# Patient Record
Sex: MALE | Race: WHITE | NOT HISPANIC OR LATINO | Employment: FULL TIME | URBAN - METROPOLITAN AREA
[De-identification: names, ages, dates, MRNs, and addresses within clinical notes are randomized per-mention and may not be internally consistent; named-entity substitution may affect disease eponyms.]

---

## 2017-02-22 ENCOUNTER — OFFICE VISIT (OUTPATIENT)
Dept: FAMILY MEDICINE | Facility: CLINIC | Age: 32
End: 2017-02-22
Payer: COMMERCIAL

## 2017-02-22 VITALS
TEMPERATURE: 97.8 F | SYSTOLIC BLOOD PRESSURE: 123 MMHG | BODY MASS INDEX: 27.55 KG/M2 | DIASTOLIC BLOOD PRESSURE: 73 MMHG | WEIGHT: 186 LBS | HEART RATE: 59 BPM | HEIGHT: 69 IN | OXYGEN SATURATION: 100 %

## 2017-02-22 DIAGNOSIS — L82.0 SEBORRHEIC KERATOSES, INFLAMED: Primary | ICD-10-CM

## 2017-02-22 PROCEDURE — 99212 OFFICE O/P EST SF 10 MIN: CPT | Performed by: PHYSICIAN ASSISTANT

## 2017-02-22 NOTE — NURSING NOTE
"Chief Complaint   Patient presents with     Mole     Mole consult behind the L arm per pt notice this 20 yrs ago        Initial /73  Pulse 59  Temp 97.8  F (36.6  C) (Oral)  Ht 5' 9\" (1.753 m)  Wt 186 lb (84.4 kg)  SpO2 100%  BMI 27.47 kg/m2 Estimated body mass index is 27.47 kg/(m^2) as calculated from the following:    Height as of this encounter: 5' 9\" (1.753 m).    Weight as of this encounter: 186 lb (84.4 kg).  Medication Reconciliation: complete      Tony Jimenez MA    "

## 2017-02-22 NOTE — LETTER
[facility]  [address] [facility] [address]  Phone: [phone]    February 22, 2017        Brock Wood  [address]  [facility] [address]          To whom it may concern:    RE: Brock JAG Wood    Patient was seen and treated today at our clinic.    Please contact me for questions or concerns.      Sincerely,        Tayo Balderrama PA-C

## 2017-02-22 NOTE — PROGRESS NOTES
"  SUBJECTIVE:                                                    Brock Wood is a 31 year old male who presents to clinic today for the following health issues:    Mole Consult, located on the back side of the L arm per pt x 20 yrs.  States he will catch it on clothes and skin will rip off and then it comes back. No pain. No changes. Girlfriend is in RN school and wanted it looked at.     Problem list and histories reviewed & adjusted, as indicated.  Additional history: as documented    Patient Active Problem List   Diagnosis     CARDIOVASCULAR SCREENING; LDL GOAL LESS THAN 160     History of appendectomy     Tobacco abuse     Pilonidal cyst     Seborrheic keratoses, inflamed     Past Surgical History   Procedure Laterality Date     Vascular surgery       Appendectomy       Orthopedic surgery  2013     finger reattachment       Social History   Substance Use Topics     Smoking status: Former Smoker     Packs/day: 1.00     Types: Hookah     Smokeless tobacco: Never Used     Alcohol use Yes      Comment: weekly     Family History   Problem Relation Age of Onset     C.A.D. Father          No current outpatient prescriptions on file.     Allergies   Allergen Reactions     Coumadin [Warfarin]      Penicillin G      Problem list, Medication list, Allergies, and Medical/Social/Surgical histories reviewed in [facility] and updated as appropriate.    OBJECTIVE:                                                    /73  Pulse 59  Temp 97.8  F (36.6  C) (Oral)  Ht 5' 9\" (1.753 m)  Wt 186 lb (84.4 kg)  SpO2 100%  BMI 27.47 kg/m2  Body mass index is 27.47 kg/(m^2).  GENERAL: healthy, alert and no distress  SKIN: <5mm oval SK like lesion    Diagnostic Test Results:  none      ASSESSMENT/PLAN:                                                        ICD-10-CM    1. Seborrheic keratoses, inflamed L82.0    patient reassurance.  warning signs discussed.   If changes follow up    Tayo Balderrama PA-C  [facility] " [place]

## 2017-02-22 NOTE — MR AVS SNAPSHOT
"              After Visit Summary   2/22/2017    Brock Wood    MRN: [id number]           Patient Information     Date Of Birth          1985        Visit Information        Provider Department      2/22/2017 11:00 AM Tayo Balderrama PA-C [facility]        Today's Diagnoses     Seborrheic keratoses, inflamed    -  1       Follow-ups after your visit        Who to contact     If you have questions or need follow up information about today's clinic visit or your schedule please contact [facility] directly at [phone].  Normal or non-critical lab and imaging results will be communicated to you by [employer], letter or phone within 4 business days after the clinic has received the results. If you do not hear from us within 7 days, please contact the clinic through Dr[employer] or phone. If you have a critical or abnormal lab result, we will notify you by phone as soon as possible.  Submit refill requests through [employer] or call your pharmacy and they will forward the refill request to us. Please allow 3 business days for your refill to be completed.          Additional Information About Your Visit        MyChart Information     [employer] gives you secure access to your electronic health record. If you see a primary care provider, you can also send messages to your care team and make appointments. If you have questions, please call your primary care clinic.  If you do not have a primary care provider, please call [phone] and they will assist you.        Care EveryWhere ID     This is your Care EveryWhere ID. This could be used by other organizations to access your [place] medical records  [id number]        Your Vitals Were     Pulse Temperature Height Pulse Oximetry BMI (Body Mass Index)       59 97.8  F (36.6  C) (Oral) 5' 9\" (1.753 m) 100% 27.47 kg/m2        Blood Pressure from Last 3 Encounters:   02/22/17 123/73   03/20/15 (P) 120/78   01/06/15 113/69    Weight from Last " 3 Encounters:   02/22/17 186 lb (84.4 kg)   03/20/15 170 lb (77.1 kg)   01/06/15 174 lb (78.9 kg)              Today, you had the following     No orders found for display       Primary Care Provider Office Phone # Fax #    [facility] [phone] [address] [facility] [address]        Thank you!     Thank you for choosing [facility]  for your care. Our goal is always to provide you with excellent care. Hearing back from our patients is one way we can continue to improve our services. Please take a few minutes to complete the written survey that you may receive in the mail after your visit with us. Thank you!             Your Updated Medication List - Protect others around you: Learn how to safely use, store and throw away your medicines at www.disposemymeds.org.      Notice  As of 2/22/2017 11:13 AM    You have not been prescribed any medications.

## 2017-04-21 ENCOUNTER — OFFICE VISIT (OUTPATIENT)
Dept: URGENT CARE | Facility: URGENT CARE | Age: 32
End: 2017-04-21
Payer: COMMERCIAL

## 2017-04-21 VITALS
BODY MASS INDEX: 25.7 KG/M2 | DIASTOLIC BLOOD PRESSURE: 69 MMHG | WEIGHT: 174 LBS | TEMPERATURE: 97.1 F | HEART RATE: 56 BPM | SYSTOLIC BLOOD PRESSURE: 116 MMHG

## 2017-04-21 DIAGNOSIS — L08.9 FINGER INFECTION: Primary | ICD-10-CM

## 2017-04-21 PROCEDURE — 99213 OFFICE O/P EST LOW 20 MIN: CPT | Performed by: NURSE PRACTITIONER

## 2017-04-21 RX ORDER — CLINDAMYCIN HCL 300 MG
300 CAPSULE ORAL 3 TIMES DAILY
Qty: 30 CAPSULE | Refills: 0 | Status: SHIPPED | OUTPATIENT
Start: 2017-04-21 | End: 2017-05-01

## 2017-04-21 NOTE — NURSING NOTE
"Chief Complaint   Patient presents with     Thumb Discomfort     Left hand thumb, injured X 1 week ago       Initial /69  Pulse 56  Temp 97.1  F (36.2  C) (Oral)  Wt 174 lb (78.9 kg)  BMI 25.7 kg/m2 Estimated body mass index is 25.7 kg/(m^2) as calculated from the following:    Height as of 2/22/17: 5' 9\" (1.753 m).    Weight as of this encounter: 174 lb (78.9 kg).  Medication Reconciliation: complete   Avril Tellez CMA      "

## 2017-04-21 NOTE — MR AVS SNAPSHOT
After Visit Summary   4/21/2017    Brock Wood    MRN: [id number]           Patient Information     Date Of Birth          1985        Visit Information        Provider Department      4/21/2017 6:05 PM Vicky Duran APRN CNP [facility]        Today's Diagnoses     Finger infection    -  1       Follow-ups after your visit        Who to contact     If you have questions or need follow up information about today's clinic visit or your schedule please contact [facility] directly at [phone].  Normal or non-critical lab and imaging results will be communicated to you by MyChart, letter or phone within 4 business days after the clinic has received the results. If you do not hear from us within 7 days, please contact the clinic through [employer] or phone. If you have a critical or abnormal lab result, we will notify you by phone as soon as possible.  Submit refill requests through "[employer] or call your pharmacy and they will forward the refill request to us. Please allow 3 business days for your refill to be completed.          Additional Information About Your Visit        MyChart Information     "[employer] gives you secure access to your electronic health record. If you see a primary care provider, you can also send messages to your care team and make appointments. If you have questions, please call your primary care clinic.  If you do not have a primary care provider, please call [phone] and they will assist you.        Care EveryWhere ID     This is your Care EveryWhere ID. This could be used by other organizations to access your [place] medical records  [id number]        Your Vitals Were     Pulse Temperature BMI (Body Mass Index)             56 97.1  F (36.2  C) (Oral) 25.7 kg/m2          Blood Pressure from Last 3 Encounters:   04/21/17 116/69   02/22/17 123/73   03/20/15 (P) 120/78    Weight from Last 3 Encounters:   04/21/17 174 lb (78.9 kg)    02/22/17 186 lb (84.4 kg)   03/20/15 170 lb (77.1 kg)              Today, you had the following     No orders found for display         Today's Medication Changes          These changes are accurate as of: 4/21/17  7:25 PM.  If you have any questions, ask your nurse or doctor.               Start taking these medicines.        Dose/Directions    clindamycin 300 MG capsule   Commonly known as:  CLEOCIN   Used for:  Finger infection        Dose:  300 mg   Take 1 capsule (300 mg) by mouth 3 times daily for 10 days   Quantity:  30 capsule   Refills:  0            Where to get your medicines      These medications were sent to [facility] [address] [place] [address] [address] [facility]  [address] [facility] [address]     Phone:  [phone]     clindamycin 300 MG capsule                Primary Care Provider Office Phone # Fax #    [facility] [phone] [address] [facility]. [facility] [address]        Thank you!     Thank you for choosing [facility] [place]  for your care. Our goal is always to provide you with excellent care. Hearing back from our patients is one way we can continue to improve our services. Please take a few minutes to complete the written survey that you may receive in the mail after your visit with us. Thank you!             Your Updated Medication List - Protect others around you: Learn how to safely use, store and throw away your medicines at www.disposemymeds.org.          This list is accurate as of: 4/21/17  7:25 PM.  Always use your most recent med list.                   Brand Name Dispense Instructions for use    clindamycin 300 MG capsule    CLEOCIN    30 capsule    Take 1 capsule (300 mg) by mouth 3 times daily for 10 days

## 2017-04-21 NOTE — PROGRESS NOTES
SUBJECTIVE:                                                    Brock Wood is a 31 year old male who presents to clinic today for the following health issues:    Patient smashed thumb X 1 week ago. Now noticing, pain, swelling, discomfort. Also squeezed thumb and cottage cheese like discharge came out. Can move normally, no deformity    Problem list and histories reviewed & adjusted, as indicated.  Additional history: as documented    Patient Active Problem List   Diagnosis     CARDIOVASCULAR SCREENING; LDL GOAL LESS THAN 160     History of appendectomy     Tobacco abuse     Pilonidal cyst     Seborrheic keratoses, inflamed     Past Surgical History:   Procedure Laterality Date     APPENDECTOMY       ORTHOPEDIC SURGERY  2013    finger reattachment     VASCULAR SURGERY         Social History   Substance Use Topics     Smoking status: Former Smoker     Packs/day: 1.00     Types: Hookah     Smokeless tobacco: Never Used     Alcohol use Yes      Comment: weekly     Family History   Problem Relation Age of Onset     C.A.D. Father            ROS:  Constitutional, HEENT, cardiovascular, pulmonary, gi and gu systems are negative, except as otherwise noted.    OBJECTIVE:                                                    /69  Pulse 56  Temp 97.1  F (36.2  C) (Oral)  Wt 174 lb (78.9 kg)  BMI 25.7 kg/m2  Body mass index is 25.7 kg/(m^2).  GENERAL: healthy, alert and no distress  RESP: lungs clear to auscultation - no rales, rhonchi or wheezes  CV: regular rate and rhythm, normal S1 S2, no S3 or S4, no murmur, click or rub, no peripheral edema and peripheral pulses strong  SKIN: Thumb erythematous, tender, warm to touch surrounding nailbed    Diagnostic Test Results:  none      ASSESSMENT/PLAN:                                                        1. Finger infection    - clindamycin (CLEOCIN) 300 MG capsule; Take 1 capsule (300 mg) by mouth 3 times daily for 10 days  Dispense: 30 capsule; Refill: 0    Home  treat and monitor symptoms, call or rtc if worsening or not improving.   See Patient Instructions    RAVEN Feliciano [facility]

## 2018-10-30 ENCOUNTER — NURSE TRIAGE (OUTPATIENT)
Dept: NURSING | Facility: CLINIC | Age: 33
End: 2018-10-30

## 2018-11-12 ENCOUNTER — OFFICE VISIT (OUTPATIENT)
Dept: FAMILY MEDICINE | Facility: CLINIC | Age: 33
End: 2018-11-12
Payer: COMMERCIAL

## 2018-11-12 VITALS
RESPIRATION RATE: 18 BRPM | DIASTOLIC BLOOD PRESSURE: 76 MMHG | TEMPERATURE: 97.9 F | SYSTOLIC BLOOD PRESSURE: 130 MMHG | HEART RATE: 64 BPM | HEIGHT: 68 IN | OXYGEN SATURATION: 99 % | BODY MASS INDEX: 26.67 KG/M2 | WEIGHT: 176 LBS

## 2018-11-12 DIAGNOSIS — L05.91 PILONIDAL CYST: Primary | ICD-10-CM

## 2018-11-12 DIAGNOSIS — Z23 NEED FOR PROPHYLACTIC VACCINATION AND INOCULATION AGAINST INFLUENZA: ICD-10-CM

## 2018-11-12 DIAGNOSIS — I82.629 ACUTE DEEP VEIN THROMBOSIS (DVT) OF UPPER EXTREMITY, UNSPECIFIED LATERALITY, UNSPECIFIED VEIN (H): ICD-10-CM

## 2018-11-12 DIAGNOSIS — Z00.00 ROUTINE GENERAL MEDICAL EXAMINATION AT A HEALTH CARE FACILITY: ICD-10-CM

## 2018-11-12 DIAGNOSIS — Z13.1 SCREENING FOR DIABETES MELLITUS: ICD-10-CM

## 2018-11-12 DIAGNOSIS — Z13.220 LIPID SCREENING: ICD-10-CM

## 2018-11-12 LAB — GLUCOSE BLD-MCNC: 73 MG/DL (ref 70–99)

## 2018-11-12 PROCEDURE — 90471 IMMUNIZATION ADMIN: CPT | Performed by: FAMILY MEDICINE

## 2018-11-12 PROCEDURE — 82947 ASSAY GLUCOSE BLOOD QUANT: CPT | Performed by: FAMILY MEDICINE

## 2018-11-12 PROCEDURE — 99395 PREV VISIT EST AGE 18-39: CPT | Mod: 25 | Performed by: FAMILY MEDICINE

## 2018-11-12 PROCEDURE — 36415 COLL VENOUS BLD VENIPUNCTURE: CPT | Performed by: FAMILY MEDICINE

## 2018-11-12 PROCEDURE — 90686 IIV4 VACC NO PRSV 0.5 ML IM: CPT | Performed by: FAMILY MEDICINE

## 2018-11-12 ASSESSMENT — PAIN SCALES - GENERAL: PAINLEVEL: NO PAIN (0)

## 2018-11-12 NOTE — LETTER
[facility]  [address] [facility] [address]  [phone]        November 18, 2019    Brock Wood  [address] [facility] [address]              Dear Brock Wood    This is to remind you that your Fasting lab is due.    You may call our office at [phone] to schedule an appointment.    Please disregard this notice if you have already had your labs drawn or made an appointment.        Sincerely,        Jason Bear MD

## 2018-11-12 NOTE — MR AVS SNAPSHOT
After Visit Summary   11/12/2018    Brock Wood    MRN: [id number]           Patient Information     Date Of Birth          1985        Visit Information        Provider Department      11/12/2018 6:15 PM Jason Bear MD [facility]        Today's Diagnoses     Pilonidal cyst    -  1    Need for prophylactic vaccination and inoculation against influenza        Routine general medical examination at a health care facility        Acute deep vein thrombosis (DVT) of upper extremity, unspecified laterality, unspecified vein (H)        Lipid screening        Screening for diabetes mellitus          Care Instructions      Preventive Health Recommendations  Male Ages 26 - 39    Yearly exam:             See your health care provider every year in order to  o   Review health changes.   o   Discuss preventive care.    o   Review your medicines if your doctor has prescribed any.    You should be tested each year for STDs (sexually transmitted diseases), if you re at risk.     After age 35, talk to your provider about cholesterol testing. If you are at risk for heart disease, have your cholesterol tested at least every 5 years.     If you are at risk for diabetes, you should have a diabetes test (fasting glucose).  Shots: Get a flu shot each year. Get a tetanus shot every 10 years.     Nutrition:    Eat at least 5 servings of fruits and vegetables daily.     Eat whole-grain bread, whole-wheat pasta and brown rice instead of white grains and rice.     Get adequate Calcium and Vitamin D.     Lifestyle    Exercise for at least 150 minutes a week (30 minutes a day, 5 days a week). This will help you control your weight and prevent disease.     Limit alcohol to one drink per day.     No smoking.     Wear sunscreen to prevent skin cancer.     See your dentist every six months for an exam and cleaning.              Follow-ups after your visit        Additional Services     GENERAL SURG  ADULT REFERRAL       Your provider has referred you to: [facility]: [facility] [phone]   [email or web address]    Please be aware that coverage of these services is subject to the terms and limitations of your health insurance plan.  Call member services at your health plan with any benefit or coverage questions.      Please bring the following with you to your appointment:    (1) Any X-Rays, CTs or MRIs which have been performed.  Contact the facility where they were done to arrange for  prior to your scheduled appointment.   (2) List of current medications   (3) This referral request   (4) Any documents/labs given to you for this referral                  Follow-up notes from your care team     Return in about 1 year (around 11/12/2019) for Physical Exam.      Future tests that were ordered for you today     Open Future Orders        Priority Expected Expires Ordered    Lipid panel reflex to direct LDL Fasting Routine  11/12/2019 11/12/2018            Who to contact     If you have questions or need follow up information about today's clinic visit or your schedule please contact [facility] directly at [phone].  Normal or non-critical lab and imaging results will be communicated to you by MyChart, letter or phone within 4 business days after the clinic has received the results. If you do not hear from us within 7 days, please contact the clinic through [employer] or phone. If you have a critical or abnormal lab result, we will notify you by phone as soon as possible.  Submit refill requests through [employer] or call your pharmacy and they will forward the refill request to us. Please allow 3 business days for your refill to be completed.          Additional Information About Your Visit        [employer] Information     [employer] gives you secure access to your electronic health record. If you see a primary care provider, you can also send messages to your  "care team and make appointments. If you have questions, please call your primary care clinic.  If you do not have a primary care provider, please call [phone] and they will assist you.        Care EveryWhere ID     This is your Care EveryWhere ID. This could be used by other organizations to access your [place] medical records  [id number]        Your Vitals Were     Pulse Temperature Respirations Height Pulse Oximetry BMI (Body Mass Index)    64 97.9  F (36.6  C) (Oral) 18 5' 8\" (1.727 m) 99% 26.76 kg/m2       Blood Pressure from Last 3 Encounters:   11/12/18 130/76   04/21/17 116/69   02/22/17 123/73    Weight from Last 3 Encounters:   11/12/18 176 lb (79.8 kg)   04/21/17 174 lb (78.9 kg)   02/22/17 186 lb (84.4 kg)              We Performed the Following     FLU VACCINE, SPLIT VIRUS, IM (QUADRIVALENT) [34849]- >3 YRS     GENERAL SURG ADULT REFERRAL     Glucose, whole blood     Vaccine Administration, Initial [09039]        Primary Care Provider Office Phone # Fax #    [facility] [phone] [address] [facility] [address]        Equal Access to Services     MICHELLE WILCOX : Hadii aad ku hadasho Soomaali, waaxda luqadaha, qaybta kaalmada adeegyada, waxay idiin hayplacidon natalie luna laelizabeth escalante. So [facility] [phone].    ATENCIÓN: Si habla español, tiene a mandel disposición servicios gratuitos de asistencia lingüística. Llame al [phone].    We comply with applicable federal civil rights laws and [place] laws. We do not discriminate on the basis of race, color, national origin, age, disability, sex, sexual orientation, or gender identity.            Thank you!     Thank you for choosing [facility]  for your care. Our goal is always to provide you with excellent care. Hearing back from our patients is one way we can continue to improve our services. Please take a few minutes to complete the written survey that you may receive in the mail after your visit with " us. Thank you!             Your Updated Medication List - Protect others around you: Learn how to safely use, store and throw away your medicines at www.disposemymeds.org.      Notice  As of 11/12/2018  6:34 PM    You have not been prescribed any medications.

## 2018-11-13 NOTE — NURSING NOTE
"Chief Complaint   Patient presents with     Mass     tailbone - x 20 years       Blood Draw       Initial /76  Pulse 64  Temp 97.9  F (36.6  C) (Oral)  Resp 18  Ht 5' 8\" (1.727 m)  Wt 176 lb (79.8 kg)  SpO2 99%  BMI 26.76 kg/m2 Estimated body mass index is 26.76 kg/(m^2) as calculated from the following:    Height as of this encounter: 5' 8\" (1.727 m).    Weight as of this encounter: 176 lb (79.8 kg).  Medication Reconciliation: complete  Christine Hernandez M.A.    "

## 2018-11-13 NOTE — PROGRESS NOTES
SUBJECTIVE:   CC: Brock Wood is an 32 year old male who presents for preventative health visit.     Healthy Habits:    Do you get at least three servings of calcium containing foods daily (dairy, green leafy vegetables, etc.)? yes    Amount of exercise or daily activities, outside of work: keeps active    Problems taking medications regularly not applicable    Medication side effects: No    Have you had an eye exam in the past two years? no    Do you see a dentist twice per year? yes    Do you have sleep apnea, excessive snoring or daytime drowsiness?no            Today's PHQ-2 Score:   PHQ-2 ( 1999 Pf[employer]) 11/12/2018 2/22/2017   Q1: Little interest or pleasure in doing things 0 0   Q2: Feeling down, depressed or hopeless 0 0   PHQ-2 Score 0 0       Abuse: Current or Past(Physical, Sexual or Emotional)- No  Do you feel safe in your environment - Yes    Social History   Substance Use Topics     Smoking status: Former Smoker     Packs/day: 1.00     Types: Hookah     Smokeless tobacco: Never Used     Alcohol use Yes      Comment: weekly      If you drink alcohol do you typically have >3 drinks per day or >7 drinks per week? No     Quit drinking 3 years. Was  and got back together                 Last PSA: No results found for: PSA    Reviewed orders with patient. Reviewed health maintenance and updated orders accordingly - Yes       Reviewed and updated as needed this visit by clinical staff  Tobacco  Allergies  Meds  Med Hx  Surg Hx  Fam Hx  Soc Hx        Reviewed and updated as needed this visit by Provider            ROS:  CONSTITUTIONAL: NEGATIVE for fever, chills, change in weight  INTEGUMENTARY/SKIN: NEGATIVE for worrisome rashes, moles or lesions  EYES: NEGATIVE for vision changes or irritation  ENT: NEGATIVE for ear, mouth and throat problems  RESP: NEGATIVE for significant cough or SOB  CV: NEGATIVE for chest pain, palpitations or peripheral edema  GI: NEGATIVE for nausea, abdominal pain,  "heartburn, or change in bowel habits   male: negative for dysuria, hematuria, decreased urinary stream, erectile dysfunction, urethral discharge  MUSCULOSKELETAL: NEGATIVE for significant arthralgias or myalgia  NEURO: NEGATIVE for weakness, dizziness or paresthesias  PSYCHIATRIC: NEGATIVE for changes in mood or affect    OBJECTIVE:   /76  Pulse 64  Temp 97.9  F (36.6  C) (Oral)  Resp 18  Ht 5' 8\" (1.727 m)  Wt 176 lb (79.8 kg)  SpO2 99%  BMI 26.76 kg/m2  EXAM:  GENERAL: healthy, alert and no distress  EYES: Eyes grossly normal to inspection, PERRL and conjunctivae and sclerae normal  HENT: ear canals and TM's normal, nose and mouth without ulcers or lesions  NECK: no adenopathy, no asymmetry, masses, or scars and thyroid normal to palpation  RESP: lungs clear to auscultation - no rales, rhonchi or wheezes  CV: regular rate and rhythm, normal S1 S2, no S3 or S4, no murmur, click or rub, no peripheral edema and peripheral pulses strong  ABDOMEN: soft, nontender, no hepatosplenomegaly, no masses and bowel sounds normal  MS: no gross musculoskeletal defects noted, no edema  SKIN: no suspicious lesions or rashes  NEURO: Normal strength and tone, mentation intact and speech normal  PSYCH: mentation appears normal, affect normal/bright  pilonidal cyst  Diagnostic Test Results:  pending    ASSESSMENT/PLAN:       ICD-10-CM    1. Need for prophylactic vaccination and inoculation against influenza Z23 FLU VACCINE, SPLIT VIRUS, IM (QUADRIVALENT) [94826]- >3 YRS     Vaccine Administration, Initial [42399]   2. Routine general medical examination at a health care facility Z00.00    3. Acute deep vein thrombosis (DVT) of upper extremity, unspecified laterality, unspecified vein (H) I82.629    4. Lipid screening Z13.220 Lipid panel reflex to direct LDL Fasting   5. Screening for diabetes mellitus Z13.1 Glucose, whole blood       COUNSELING:  Reviewed preventive health counseling, as reflected in patient " "instructions       Regular exercise       Healthy diet/nutrition    BP Readings from Last 1 Encounters:   11/12/18 130/76     Estimated body mass index is 26.76 kg/(m^2) as calculated from the following:    Height as of this encounter: 5' 8\" (1.727 m).    Weight as of this encounter: 176 lb (79.8 kg).           reports that he has quit smoking. His smoking use included Hookah. He smoked 1.00 pack per day. He has never used smokeless tobacco.      Counseling Resources:  ATP IV Guidelines  Pooled Cohorts Equation Calculator  FRAX Risk Assessment  ICSI Preventive Guidelines  Dietary Guidelines for Americans, 2010  [employer]'s MyPlate  ASA Prophylaxis  Lung CA Screening    Jason Bear MD  [facility]    Injectable Influenza Immunization Documentation    1.  Is the person to be vaccinated sick today?   No    2. Does the person to be vaccinated have an allergy to a component   of the vaccine?   No  Egg Allergy Algorithm Link    3. Has the person to be vaccinated ever had a serious reaction   to influenza vaccine in the past?   No    4. Has the person to be vaccinated ever had Guillain-Barré syndrome?   No    Form completed by   Christine Hernandez M.A.           "

## 2018-11-13 NOTE — PATIENT INSTRUCTIONS
Preventive Health Recommendations  Male Ages 26 - 39    Yearly exam:             See your health care provider every year in order to  o   Review health changes.   o   Discuss preventive care.    o   Review your medicines if your doctor has prescribed any.    You should be tested each year for STDs (sexually transmitted diseases), if you re at risk.     After age 35, talk to your provider about cholesterol testing. If you are at risk for heart disease, have your cholesterol tested at least every 5 years.     If you are at risk for diabetes, you should have a diabetes test (fasting glucose).  Shots: Get a flu shot each year. Get a tetanus shot every 10 years.     Nutrition:    Eat at least 5 servings of fruits and vegetables daily.     Eat whole-grain bread, whole-wheat pasta and brown rice instead of white grains and rice.     Get adequate Calcium and Vitamin D.     Lifestyle    Exercise for at least 150 minutes a week (30 minutes a day, 5 days a week). This will help you control your weight and prevent disease.     Limit alcohol to one drink per day.     No smoking.     Wear sunscreen to prevent skin cancer.     See your dentist every six months for an exam and cleaning.

## 2019-01-14 ENCOUNTER — OFFICE VISIT (OUTPATIENT)
Dept: SURGERY | Facility: CLINIC | Age: 34
End: 2019-01-14
Payer: COMMERCIAL

## 2019-01-14 VITALS
BODY MASS INDEX: 26.67 KG/M2 | SYSTOLIC BLOOD PRESSURE: 130 MMHG | DIASTOLIC BLOOD PRESSURE: 76 MMHG | HEIGHT: 68 IN | HEART RATE: 64 BPM | WEIGHT: 176 LBS

## 2019-01-14 DIAGNOSIS — L05.91 PILONIDAL CYST WITHOUT INFECTION: Primary | ICD-10-CM

## 2019-01-14 PROCEDURE — 99243 OFF/OP CNSLTJ NEW/EST LOW 30: CPT | Mod: QW | Performed by: SURGERY

## 2019-01-14 ASSESSMENT — MIFFLIN-ST. JEOR: SCORE: 1722.83

## 2019-01-14 NOTE — PROGRESS NOTES
Patient seen in consultation for pilonidal cyst by Jason Bear    HPI:  Patient is a 33 year old male  with complaints of cyst on talbone  The patient noticed the symptoms about 20 years ago.    Had gotten very large in past, had to have drained, maybe in 2012  In past few years has actually felt pretty good overall  Every once in awhile will get sore, if pressure on it or it is hit then will feel it  Can get some drainage from it, can look like red/yellow fluid when it does drain  time makes the episode better.  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)  Had bleeding/coagulation issues with coumadin, ended up getting IVC filter (now removed)    Past Surgical History:   Procedure Laterality Date     APPENDECTOMY       ORTHOPEDIC SURGERY  2013    finger reattachment     VASCULAR SURGERY     Fasciotomy right calf, thigh related to internal bleeding with coumadin    Social History     Socioeconomic History     Marital status:      Spouse name: Not on file     Number of children: Not on file     Years of education: Not on file     Highest education level: Not on file   Social Needs     Financial resource strain: Not on file     Food insecurity - worry: Not on file     Food insecurity - inability: Not on file     Transportation needs - medical: Not on file     Transportation needs - non-medical: Not on file   Occupational History     Not on file   Tobacco Use     Smoking status: Former Smoker      "Packs/day: 1.00     Types: Hookah     Smokeless tobacco: Never Used   Substance and Sexual Activity     Alcohol use: Yes     Comment: weekly     Drug use: No     Sexual activity: Yes     Partners: Female   Other Topics Concern     Parent/sibling w/ CABG, MI or angioplasty before 65F 55M? Yes   Social History Narrative     Not on file       No current outpatient medications on file.       Medications and history reviewed    Physical exam:  Vitals: /76   Pulse 64   Ht 1.727 m (5' 8\")   Wt 79.8 kg (176 lb)   BMI 26.76 kg/m    BMI= Body mass index is 26.76 kg/m .    Constitutional: healthy, alert and no distress  Head: Normocephalic. No masses, lesions, tenderness or abnormalities  Cardiovascular: negative, PMI normal. No lifts, heaves, or thrills. RRR. No murmurs, clicks gallops or rub  Respiratory: negative, Percussion normal. Good diaphragmatic excursion. Lungs clear  Gastrointestinal: Abdomen soft, non-tender. BS normal. No masses, organomegaly  : Deferred  Musculoskeletal: extremities normal- no gross deformities noted, gait normal and normal muscle tone  Skin: intergluteal cleft with large amount of hair, superiorly and to left of midline is small scar from previous drainage. In this area is palpable pilonidal cyst ~2-3 cm. Just inferior to this and in midline, over the tip of the coccyx are 3-4 small pilonidal sinus openings. No current drainage, no erythema.   Psychiatric: mentation appears normal and affect normal/bright  Patient able to get up on table without difficulty.    Labs:  Wound culture from finger infection issue 2013. Appears to not be MRSA  Susceptibility     Light growth staphylococcus aureus (milo) (4)     Antibiotic Interpretation Sensitivity Method Status   CIPROFLOXACIN  >8.0 Resistant ug/mL Not Specified Final   CLINDAMYCIN  <=0.25 Susceptible ug/mL Not Specified Final   ERYTHROMYCIN  <=0.25 Susceptible ug/mL Not Specified Final   GENTAMICIN  <=0.5 Susceptible ug/mL Not Specified " Final   LEVOFLOXACIN  >8.0 Resistant ug/mL Not Specified Final   OXACILLIN  0.5 Susceptible ug/mL Not Specified Final   PENICILLIN  >.5 Resistant ug/mL Not Specified Final   TETRACYCLINE  <=1.0 Susceptible ug/mL Not Specified Final   Trimethoprim/Sulfa  <=0.5/9.5 Susceptible ug/mL Not Specified Final   VANCOMYCIN  1 Susceptible ug/mL Not         Assessment:     ICD-10-CM    1. Pilonidal cyst without infection L05.91      Plan: Discussed excision option for his pilonidal- not many issues currently as compared to in past before was drained but still does get pain and drainage. Not currently infected. Patient would like to go ahead with excision but maybe in a few months. Discussed what to expect from surgery, chance of infection, bleeding, cyst recurrence. Preventative measures of keeping area clean, dry, hair removal, limiting direct pressure/sitting as able. No further questions or concerns.    Bon Parikh MD

## 2019-01-14 NOTE — LETTER
1/14/2019         RE: Brock Wood  [address] [place] [address]  [place] [address]        Dear Colleague,    Thank you for referring your patient, Brock Wood, to the [facility]. Please see a copy of my visit note below.    Patient seen in consultation for pilonidal cyst by Jason Bear    HPI:  Patient is a 33 year old male  with complaints of cyst on talbone  The patient noticed the symptoms about 20 years ago.    Had gotten very large in past, had to have drained, maybe in 2012  In past few years has actually felt pretty good overall  Every once in awhile will get sore, if pressure on it or it is hit then will feel it  Can get some drainage from it, can look like red/yellow fluid when it does drain  time makes the episode better.  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)  Had bleeding/coagulation issues with coumadin, ended up getting IVC filter (now removed)    Past Surgical History:   Procedure Laterality Date     APPENDECTOMY       ORTHOPEDIC SURGERY  2013    finger reattachment     VASCULAR SURGERY     Fasciotomy right calf, thigh related to internal bleeding with coumadin    Social History     Socioeconomic History     Marital status:      Spouse name: Not on file     Number of children: Not on file     Years of education: Not on file     Highest education level: Not on file   Social Needs     Financial resource strain: Not on file     Food insecurity - worry: Not on file  "    Food insecurity - inability: Not on file     Transportation needs - medical: Not on file     Transportation needs - non-medical: Not on file   Occupational History     Not on file   Tobacco Use     Smoking status: Former Smoker     Packs/day: 1.00     Types: Hookah     Smokeless tobacco: Never Used   Substance and Sexual Activity     Alcohol use: Yes     Comment: weekly     Drug use: No     Sexual activity: Yes     Partners: Female   Other Topics Concern     Parent/sibling w/ CABG, MI or angioplasty before 65F 55M? Yes   Social History Narrative     Not on file       No current outpatient medications on file.       Medications and history reviewed    Physical exam:  Vitals: /76   Pulse 64   Ht 1.727 m (5' 8\")   Wt 79.8 kg (176 lb)   BMI 26.76 kg/m     BMI= Body mass index is 26.76 kg/m .    Constitutional: healthy, alert and no distress  Head: Normocephalic. No masses, lesions, tenderness or abnormalities  Cardiovascular: negative, PMI normal. No lifts, heaves, or thrills. RRR. No murmurs, clicks gallops or rub  Respiratory: negative, Percussion normal. Good diaphragmatic excursion. Lungs clear  Gastrointestinal: Abdomen soft, non-tender. BS normal. No masses, organomegaly  : Deferred  Musculoskeletal: extremities normal- no gross deformities noted, gait normal and normal muscle tone  Skin: intergluteal cleft with large amount of hair, superiorly and to left of midline is small scar from previous drainage. In this area is palpable pilonidal cyst ~2-3 cm. Just inferior to this and in midline, over the tip of the coccyx are 3-4 small pilonidal sinus openings. No current drainage, no erythema.   Psychiatric: mentation appears normal and affect normal/bright  Patient able to get up on table without difficulty.    Labs:  Wound culture from finger infection issue 2013. Appears to not be MRSA  Susceptibility     Light growth staphylococcus aureus (milo) (4)     Antibiotic Interpretation Sensitivity Method " Status   CIPROFLOXACIN  >8.0 Resistant ug/mL Not Specified Final   CLINDAMYCIN  <=0.25 Susceptible ug/mL Not Specified Final   ERYTHROMYCIN  <=0.25 Susceptible ug/mL Not Specified Final   GENTAMICIN  <=0.5 Susceptible ug/mL Not Specified Final   LEVOFLOXACIN  >8.0 Resistant ug/mL Not Specified Final   OXACILLIN  0.5 Susceptible ug/mL Not Specified Final   PENICILLIN  >.5 Resistant ug/mL Not Specified Final   TETRACYCLINE  <=1.0 Susceptible ug/mL Not Specified Final   Trimethoprim/Sulfa  <=0.5/9.5 Susceptible ug/mL Not Specified Final   VANCOMYCIN  1 Susceptible ug/mL Not         Assessment:     ICD-10-CM    1. Pilonidal cyst without infection L05.91      Plan: Discussed excision option for his pilonidal- not many issues currently as compared to in past before was drained but still does get pain and drainage. Not currently infected. Patient would like to go ahead with excision but maybe in a few months. Discussed what to expect from surgery, chance of infection, bleeding, cyst recurrence. Preventative measures of keeping area clean, dry, hair removal, limiting direct pressure/sitting as able. No further questions or concerns.    Bon Parikh MD      Again, thank you for allowing me to participate in the care of your patient.        Sincerely,        Bon Parikh MD

## 2019-01-15 ENCOUNTER — TELEPHONE (OUTPATIENT)
Dept: SURGERY | Facility: CLINIC | Age: 34
End: 2019-01-15

## 2019-12-17 ENCOUNTER — DOCUMENTATION ONLY (OUTPATIENT)
Dept: FAMILY MEDICINE | Facility: CLINIC | Age: 34
End: 2019-12-17

## 2019-12-17 NOTE — PROGRESS NOTES
Labs removed; was a lipid panel ordered at a routine physical 11/12/18.  No return visit.  Julianna Prajapati RN

## 2019-12-17 NOTE — PROGRESS NOTES
This patient has overdue labs. A letter was sent on 11/18/2019 and there has been no lab appointment made. If you still want these labs done, please have your care team contact the patient to make a lab appointment. Otherwise, please have the labs discontinued and close the encounter.    Thank you,  [facility]

## 2020-02-23 ENCOUNTER — HEALTH MAINTENANCE LETTER (OUTPATIENT)
Age: 35
End: 2020-02-23

## 2020-12-06 ENCOUNTER — HEALTH MAINTENANCE LETTER (OUTPATIENT)
Age: 35
End: 2020-12-06

## 2021-04-11 ENCOUNTER — HEALTH MAINTENANCE LETTER (OUTPATIENT)
Age: 36
End: 2021-04-11

## 2021-09-26 ENCOUNTER — HEALTH MAINTENANCE LETTER (OUTPATIENT)
Age: 36
End: 2021-09-26

## 2022-04-18 ENCOUNTER — OFFICE VISIT (OUTPATIENT)
Dept: DERMATOLOGY | Facility: CLINIC | Age: 37
End: 2022-04-18
Payer: COMMERCIAL

## 2022-04-18 DIAGNOSIS — L81.4 SOLAR LENTIGO: Primary | ICD-10-CM

## 2022-04-18 DIAGNOSIS — L82.1 SEBORRHEIC KERATOSES: ICD-10-CM

## 2022-04-18 DIAGNOSIS — D22.9 MULTIPLE BENIGN NEVI: ICD-10-CM

## 2022-04-18 DIAGNOSIS — D18.01 CHERRY ANGIOMA: ICD-10-CM

## 2022-04-18 PROCEDURE — 99203 OFFICE O/P NEW LOW 30 MIN: CPT | Performed by: DERMATOLOGY

## 2022-04-18 NOTE — LETTER
4/18/2022         RE: Brock Wood  [address]   [place] [address]        Dear Colleague,    Thank you for referring your patient, Brock Wood, to the [facility]. Please see a copy of my visit note below.    [facility] Dermatology Note  Encounter Date: Apr 18, 2022  Office Visit     Dermatology Problem List:  1. None.    ____________________________________________    Assessment & Plan:    # Benign lesions: Multiple benign nevi, solar lentigos, seborrheic keratoses, cherry angiomas. Explained to patient benign nature of lesion. No treatment is necessary at this time unless the lesion changes or becomes symptomatic.   - ABCDs of melanoma were discussed and self skin checks were advised.  - Sun precaution was advised including the use of sun screens of SPF 30 or higher, sun protective clothing, and avoidance of tanning beds.       Procedures Performed:   None.    Follow-up: PRN for new or changing lesions    Staff and Scribe:     Scribe Disclosure:   I, Severiano Lea, am serving as a scribe to document services personally performed by this physician, Dr. Obey Morgan, based on data collection and the provider's statements to me.     Provider Disclosure:   The documentation recorded by the scribe accurately reflects the services I personally performed and the decisions made by me.    Obey Morgan MD    Department of Dermatology  [facility]: Phone: [phone], Fax:[phone]  [facility]: Phone: [phone] Fax: [phone]  ____________________________________________    CC: Skin Check (Full body skin check. Spot of concern on back and back of left arm. Patient has not seen dermatologist previously. No known family history of skin cancer.)    HPI:  Mr. Brock Wood is a(n) 36 year old male who presents today as a new  "patient for a skin check.    Self referred.    Today, he has the following areas of concern:    1) Lesion on the left shoulder.  2) Lesion on the left upper arm. He notes he has \"frozen off\" this spot in the past, but it grew back.    The patient otherwise denies any new or concerning lesions. No bleeding, painful, pruritic, or changing lesions. They report no personal history of skin cancer. There is a possible family history of skin cancer (mother with possible NMSC). No history of immunosuppression. He has a history of indoor tanning (twice). They do not use sunscreen and protective clothing when outdoors for sun protection. No occupational exposure to ultraviolet light or other forms of radiation. Patient is an . Health otherwise stable. No other skin concerns.     Patient is otherwise feeling well, without additional skin concerns.    Labs Reviewed:  N/A    Physical Exam:  Vitals: There were no vitals taken for this visit.  SKIN: Full skin, which includes the head/face, both arms, chest, back, abdomen,both legs, genitalia and/or groin buttocks, digits and/or nails, was examined.  - There are dome shaped bright red papules on the trunk and extremities.   - Multiple regular brown pigmented macules and papules are identified on the trunk and extremities.   - Scattered brown macules on sun exposed areas.  - There are waxy stuck on tan to brown papules on the trunk and extremities.  - No other lesions of concern on areas examined.     Medications:  No current outpatient medications on file.     No current facility-administered medications for this visit.      Past Medical History:   Patient Active Problem List   Diagnosis     CARDIOVASCULAR SCREENING; LDL GOAL LESS THAN 160     History of appendectomy     Tobacco abuse     Pilonidal cyst     Seborrheic keratoses, inflamed     Acute deep vein thrombosis (DVT) of upper extremity (H)     Past Medical History:   Diagnosis Date     History of blood " transfusion            Again, thank you for allowing me to participate in the care of your patient.        Sincerely,        Obey Morgan MD

## 2022-04-18 NOTE — NURSING NOTE
Brock Wood's goals for this visit include:   Chief Complaint   Patient presents with     Skin Check     Full body skin check. Spot of concern on back and back of left arm. Patient has not seen dermatologist previously. No known family history of skin cancer.       He requests these members of his care team be copied on today's visit information:     PCP: [facility]    Referring Provider:  No referring provider defined for this encounter.    There were no vitals taken for this visit.    Do you need any medication refills at today's visit? Stefania Richmond CMA

## 2022-04-18 NOTE — PROGRESS NOTES
"[facility] Dermatology Note  Encounter Date: Apr 18, 2022  Office Visit     Dermatology Problem List:  1. None.    ____________________________________________    Assessment & Plan:    # Benign lesions: Multiple benign nevi, solar lentigos, seborrheic keratoses, cherry angiomas. Explained to patient benign nature of lesion. No treatment is necessary at this time unless the lesion changes or becomes symptomatic.   - ABCDs of melanoma were discussed and self skin checks were advised.  - Sun precaution was advised including the use of sun screens of SPF 30 or higher, sun protective clothing, and avoidance of tanning beds.       Procedures Performed:   None.    Follow-up: PRN for new or changing lesions    Staff and Scribe:     Scribe Disclosure:   I, Severiano Lea, am serving as a scribe to document services personally performed by this physician, Dr. Obey Morgan, based on data collection and the provider's statements to me.     Provider Disclosure:   The documentation recorded by the scribe accurately reflects the services I personally performed and the decisions made by me.    Obey Morgan MD    Department of Dermatology  [facility]: Phone: [phone], Fax:[phone]  [facility]: Phone: [phone] Fax: [phone]  ____________________________________________    CC: Skin Check (Full body skin check. Spot of concern on back and back of left arm. Patient has not seen dermatologist previously. No known family history of skin cancer.)    HPI:  Mr. Brock Wood is a(n) 36 year old male who presents today as a new patient for a skin check.    Self referred.    Today, he has the following areas of concern:    1) Lesion on the left shoulder.  2) Lesion on the left upper arm. He notes he has \"frozen off\" this spot in the past, but it grew back.    The patient " otherwise denies any new or concerning lesions. No bleeding, painful, pruritic, or changing lesions. They report no personal history of skin cancer. There is a possible family history of skin cancer (mother with possible NMSC). No history of immunosuppression. He has a history of indoor tanning (twice). They do not use sunscreen and protective clothing when outdoors for sun protection. No occupational exposure to ultraviolet light or other forms of radiation. Patient is an . Health otherwise stable. No other skin concerns.     Patient is otherwise feeling well, without additional skin concerns.    Labs Reviewed:  N/A    Physical Exam:  Vitals: There were no vitals taken for this visit.  SKIN: Full skin, which includes the head/face, both arms, chest, back, abdomen,both legs, genitalia and/or groin buttocks, digits and/or nails, was examined.  - There are dome shaped bright red papules on the trunk and extremities.   - Multiple regular brown pigmented macules and papules are identified on the trunk and extremities.   - Scattered brown macules on sun exposed areas.  - There are waxy stuck on tan to brown papules on the trunk and extremities.  - No other lesions of concern on areas examined.     Medications:  No current outpatient medications on file.     No current facility-administered medications for this visit.      Past Medical History:   Patient Active Problem List   Diagnosis     CARDIOVASCULAR SCREENING; LDL GOAL LESS THAN 160     History of appendectomy     Tobacco abuse     Pilonidal cyst     Seborrheic keratoses, inflamed     Acute deep vein thrombosis (DVT) of upper extremity (H)     Past Medical History:   Diagnosis Date     History of blood transfusion

## 2022-04-18 NOTE — PATIENT INSTRUCTIONS
You should have your primary care provider examine your skin yearly. If they or you see anything concerning, you can return to dermatology for another full body skin check or a spot check.      Patient Education     Checking for Skin Cancer  You can find cancer early by checking your skin each month. There are 3 kinds of skin cancer. They are melanoma, basal cell carcinoma, and squamous cell carcinoma. Doing monthly skin checks is the best way to find new marks or skin changes. Follow the instructions below for checking your skin.   The ABCDEs of checking moles for melanoma   Check your moles or growths for signs of melanoma using ABCDE:   Asymmetry: the sides of the mole or growth don t match  Border: the edges are ragged, notched, or blurred  Color: the color within the mole or growth varies  Diameter: the mole or growth is larger than 6 mm (size of a pencil eraser)  Evolving: the size, shape, or color of the mole or growth is changing (evolving is not shown in the images below)    Checking for other types of skin cancer  Basal cell carcinoma or squamous cell carcinoma have symptoms such as:     A spot or mole that looks different from all other marks on your skin  Changes in how an area feels, such as itching, tenderness, or pain  Changes in the skin's surface, such as oozing, bleeding, or scaliness  A sore that does not heal  New swelling or redness beyond the border of a mole    Who s at risk?  Anyone can get skin cancer. But you are at greater risk if you have:   Fair skin, light-colored hair, or light-colored eyes  Many moles or abnormal moles on your skin  A history of sunburns from sunlight or tanning beds  A family history of skin cancer  A history of exposure to radiation or chemicals  A weakened immune system  If you have had skin cancer in the past, you are at risk for recurring skin cancer.   How to check your skin  Do your monthly skin checkups in front of a full-length mirror. Check all parts of your  body, including your:   Head (ears, face, neck, and scalp)  Torso (front, back, and sides)  Arms (tops, undersides, upper, and lower armpits)  Hands (palms, backs, and fingers, including under the nails)  Buttocks and genitals  Legs (front, back, and sides)  Feet (tops, soles, toes, including under the nails, and between toes)  If you have a lot of moles, take digital photos of them each month. Make sure to take photos both up close and from a distance. These can help you see if any moles change over time.   Most skin changes are not cancer. But if you see any changes in your skin, call your doctor right away. Only he or she can diagnose a problem. If you have skin cancer, seeing your doctor can be the first step toward getting the treatment that could save your life.   Mama last reviewed this educational content on 4/1/2019 2000-2020 [employer]. [address] [place] [address]. All rights reserved. This information is not intended as a substitute for professional medical care. Always follow your healthcare professional's instructions.       When should I call my doctor?  If you are worsening or not improving, please, contact us or seek urgent care as noted below.     Who should I call with questions (adults)?  [facility] (adult and pediatric): [phone]  [facility] (adult): [phone]  For urgent needs outside of business hours call the [facility] at [phone] and ask for the dermatology resident on call to be paged  If this is a medical emergency and you are unable to reach an ER, Call [phone]    Who should I call with questions (pediatric)?  [facility]- Pediatric Dermatology  Dr. Ludivina Schuler, Dr. Evelyn Miller, Dr. Mai Leon, MANUEL Esquivel, Dr. Irena Trevino, Dr. Karma Martinez & Dr. Jason Stephenson  Non-urgent nurse triage line; [phone]- [facility]  Coordinators   Ana Cristina (/Complex ) [phone]    If you need a prescription refill, please contact your pharmacy. Refills are approved or denied by our Physicians during normal business hours, Monday through Fridays  Per office policy, refills will not be granted if you have not been seen within the past year (or sooner depending on your child's condition)    Scheduling Information:  Pediatric Appointment Scheduling and Call Center [phone]  Radiology Scheduling- [phone]  Sedation Unit Scheduling- [phone]  [place] Scheduling- General [phone]; Pediatric Dermatology [phone]  Main  Services: [phone]  French: [phone]  Sri Lankan: [phone]  Hmong/Polish/P[place]ua [place]inean: [phone]  Preadmission Nursing Department Fax Number: [phone] (Fax all pre-operative paperwork to this number)    For urgent matters arising during evenings, weekends, or holidays that cannot wait for normal business hours please call [phone] and ask for the dermatology resident on call to be paged.

## 2022-05-07 ENCOUNTER — HEALTH MAINTENANCE LETTER (OUTPATIENT)
Age: 37
End: 2022-05-07

## 2022-09-26 ENCOUNTER — OFFICE VISIT (OUTPATIENT)
Dept: FAMILY MEDICINE | Facility: CLINIC | Age: 37
End: 2022-09-26
Payer: COMMERCIAL

## 2022-09-26 VITALS
HEIGHT: 68 IN | OXYGEN SATURATION: 98 % | SYSTOLIC BLOOD PRESSURE: 138 MMHG | TEMPERATURE: 98.6 F | DIASTOLIC BLOOD PRESSURE: 81 MMHG | HEART RATE: 72 BPM | BODY MASS INDEX: 27.28 KG/M2 | WEIGHT: 180 LBS

## 2022-09-26 DIAGNOSIS — Z00.00 ROUTINE GENERAL MEDICAL EXAMINATION AT A HEALTH CARE FACILITY: Primary | ICD-10-CM

## 2022-09-26 LAB — HBA1C MFR BLD: 5.1 % (ref 0–5.6)

## 2022-09-26 PROCEDURE — 90471 IMMUNIZATION ADMIN: CPT | Performed by: FAMILY MEDICINE

## 2022-09-26 PROCEDURE — 80053 COMPREHEN METABOLIC PANEL: CPT | Performed by: FAMILY MEDICINE

## 2022-09-26 PROCEDURE — 87389 HIV-1 AG W/HIV-1&-2 AB AG IA: CPT | Performed by: FAMILY MEDICINE

## 2022-09-26 PROCEDURE — 83036 HEMOGLOBIN GLYCOSYLATED A1C: CPT | Performed by: FAMILY MEDICINE

## 2022-09-26 PROCEDURE — 36415 COLL VENOUS BLD VENIPUNCTURE: CPT | Performed by: FAMILY MEDICINE

## 2022-09-26 PROCEDURE — 99385 PREV VISIT NEW AGE 18-39: CPT | Mod: 25 | Performed by: FAMILY MEDICINE

## 2022-09-26 PROCEDURE — 86803 HEPATITIS C AB TEST: CPT | Performed by: FAMILY MEDICINE

## 2022-09-26 PROCEDURE — 90715 TDAP VACCINE 7 YRS/> IM: CPT | Performed by: FAMILY MEDICINE

## 2022-09-26 PROCEDURE — 80061 LIPID PANEL: CPT | Performed by: FAMILY MEDICINE

## 2022-09-26 ASSESSMENT — ENCOUNTER SYMPTOMS
NAUSEA: 0
FREQUENCY: 0
JOINT SWELLING: 0
NERVOUS/ANXIOUS: 0
FEVER: 0
PALPITATIONS: 0
HEADACHES: 0
SORE THROAT: 0
ARTHRALGIAS: 0
HEARTBURN: 0
DIZZINESS: 0
HEMATOCHEZIA: 0
HEMATURIA: 0
ABDOMINAL PAIN: 0
DYSURIA: 0
CHILLS: 0
SHORTNESS OF BREATH: 0
PARESTHESIAS: 0
EYE PAIN: 0
MYALGIAS: 0
WEAKNESS: 0
COUGH: 0
DIARRHEA: 0
CONSTIPATION: 0

## 2022-09-26 ASSESSMENT — PAIN SCALES - GENERAL: PAINLEVEL: NO PAIN (0)

## 2022-09-26 NOTE — NURSING NOTE
Prior to immunization administration, verified patients identity using patient s name and date of birth. Please see Immunization Activity for additional information.     Screening Questionnaire for Adult Immunization    Are you sick today?   No   Do you have allergies to medications, food, a vaccine component or latex?   No   Have you ever had a serious reaction after receiving a vaccination?   No   Do you have a long-term health problem with heart, lung, kidney, or metabolic disease (e.g., diabetes), asthma, a blood disorder, no spleen, complement component deficiency, a cochlear implant, or a spinal fluid leak?  Are you on long-term aspirin therapy?   No   Do you have cancer, leukemia, HIV/AIDS, or any other immune system problem?   No   Do you have a parent, brother, or sister with an immune system problem?   No   In the past 3 months, have you taken medications that affect  your immune system, such as prednisone, other steroids, or anticancer drugs; drugs for the treatment of rheumatoid arthritis, Crohn s disease, or psoriasis; or have you had radiation treatments?   No   Have you had a seizure, or a brain or other nervous system problem?   No   During the past year, have you received a transfusion of blood or blood    products, or been given immune (gamma) globulin or antiviral drug?   No   For women: Are you pregnant or is there a chance you could become       pregnant during the next month?   No   Have you received any vaccinations in the past 4 weeks?   No     Immunization questionnaire answers were all negative.        Per orders of Dr. Mckay, injection of tdap given by Odessa Vanegas CMA. Patient instructed to remain in clinic for 15 minutes afterwards, and to report any adverse reaction to me immediately.       Screening performed by Odessa Vanegas CMA on 9/26/2022 at 5:41 PM.

## 2022-09-26 NOTE — PROGRESS NOTES
SUBJECTIVE:   CC: Brock is an 36 year old who presents for preventative health visit.       Patient has been advised of split billing requirements and indicates understanding: Yes  Healthy Habits:     Getting at least 3 servings of Calcium per day:  Yes    Bi-annual eye exam:  NO    Dental care twice a year:  Yes    Sleep apnea or symptoms of sleep apnea:  None    Diet:  Regular (no restrictions)    Frequency of exercise:  6-7 days/week    Duration of exercise:  Greater than 60 minutes    Taking medications regularly:  Yes    Medication side effects:  None    PHQ-2 Total Score: 0    Additional concerns today:  Yes    - Vasectomy       Preventive -    Immunization History   Administered Date(s) Administered     COVID-19,PF,Pfizer (12+ Yrs) 08/07/2021, 08/28/2021     HepB 11/23/1998, 01/18/1999, 06/18/1999     HepB, Unspecified 11/23/1998, 01/18/1999, 06/18/1999     Historical Hepb 11/23/1998, 01/18/1999, 06/18/1999     Influenza Vaccine IM > 6 months Valent IIV4 (Alfuria,Fluzone) 11/12/2018     MMR 11/23/1998     TD (ADULT, 7+) 05/19/2005     Td (Adult), Adsorbed 08/12/2011     Tdap (Adacel,Boostrix) 08/12/2011, 09/26/2022     Tdap (Adult) Unspecified Formulation 05/19/2005     TDAP today     - Colon CA screen: Colonoscopy, age 45-75 every 10 years or FIT every year or Cologuard every 3 years   No family hx colon cancer       -lipids screen: ordered     Diabetes screen: ordered        Today's PHQ-2 Score:   PHQ-2 ( 1999 Pfizer) 9/26/2022   Q1: Little interest or pleasure in doing things 0   Q2: Feeling down, depressed or hopeless 0   PHQ-2 Score 0   PHQ-2 Total Score (12-17 Years)- Positive if 3 or more points; Administer PHQ-A if positive -   Q1: Little interest or pleasure in doing things Not at all   Q2: Feeling down, depressed or hopeless Not at all   PHQ-2 Score 0   SH:    Marital status:    Kids: 2  Employment:    Exercise: yes - daily   Tobacco: no former quit 09/2015   Etoh former quit  03/2016  Recreational drugs: no  Caffeine: coffee       Abuse: Current or Past(Physical, Sexual or Emotional)- No  Do you feel safe in your environment? Yes    Have you ever done Advance Care Planning? (For example, a Health Directive, POLST, or a discussion with a medical provider or your loved ones about your wishes): No, advance care planning information given to patient to review.  Patient plans to discuss their wishes with loved ones or provider.      Social History     Tobacco Use     Smoking status: Former Smoker     Packs/day: 1.00     Types: Hookah     Smokeless tobacco: Never Used   Substance Use Topics     Alcohol use: Yes     Comment: weekly     If you drink alcohol do you typically have >3 drinks per day or >7 drinks per week? No    Alcohol Use 9/26/2022   Prescreen: >3 drinks/day or >7 drinks/week? Not Applicable   Prescreen: >3 drinks/day or >7 drinks/week? -   No flowsheet data found.    Last PSA: No results found for: PSA    Reviewed orders with patient. Reviewed health maintenance and updated orders accordingly - Yes  Lab work is in process  BP Readings from Last 3 Encounters:   09/26/22 138/81   01/14/19 130/76   11/12/18 130/76    Wt Readings from Last 3 Encounters:   09/26/22 81.6 kg (180 lb)   01/14/19 79.8 kg (176 lb)   11/12/18 79.8 kg (176 lb)                  Patient Active Problem List   Diagnosis     CARDIOVASCULAR SCREENING; LDL GOAL LESS THAN 160     History of appendectomy     Tobacco abuse     Pilonidal cyst     Seborrheic keratoses, inflamed     Acute deep vein thrombosis (DVT) of upper extremity (H)     Past Surgical History:   Procedure Laterality Date     APPENDECTOMY       APPENDECTOMY       APPENDECTOMY       IR IVC FILTER PLACEMENT  11/23/2014     ORTHOPEDIC SURGERY  2013    finger reattachment     OTHER SURGICAL HISTORY      ORIF right 5th distal phalanx     VASCULAR SURGERY         Social History     Tobacco Use     Smoking status: Former Smoker     Packs/day: 1.00     Types:  Hookah     Smokeless tobacco: Never Used   Substance Use Topics     Alcohol use: Yes     Comment: weekly     Family History   Problem Relation Age of Onset     Asthma Mother      Thyroid Disease Father      C.A.D. Father      Heart Disease Father      Asthma Brother          No current outpatient medications on file.     Allergies   Allergen Reactions     Coumadin [Warfarin]      Penicillin G      Recent Labs   Lab Test 03/20/15  1133   CR 0.84   GFRESTIMATED >90  Non  GFR Calc     GFRESTBLACK >90   GFR Calc          Reviewed and updated as needed this visit by clinical staff   Tobacco  Allergies  Meds   Med Hx  Surg Hx  Fam Hx  Soc Hx          Reviewed and updated as needed this visit by Provider       Med Hx  Surg Hx  Fam Hx           Past Medical History:   Diagnosis Date     Acute DVT (deep venous thrombosis) (H)      Compartment syndrome (H)      History of blood transfusion       Past Surgical History:   Procedure Laterality Date     APPENDECTOMY       APPENDECTOMY       APPENDECTOMY       IR IVC FILTER PLACEMENT  11/23/2014     ORTHOPEDIC SURGERY  2013    finger reattachment     OTHER SURGICAL HISTORY      ORIF right 5th distal phalanx     VASCULAR SURGERY         Review of Systems   Constitutional: Negative for chills and fever.   HENT: Negative for congestion, ear pain, hearing loss and sore throat.    Eyes: Negative for pain and visual disturbance.   Respiratory: Negative for cough and shortness of breath.    Cardiovascular: Negative for chest pain, palpitations and peripheral edema.   Gastrointestinal: Negative for abdominal pain, constipation, diarrhea, heartburn, hematochezia and nausea.   Genitourinary: Negative for dysuria, frequency, genital sores, hematuria, impotence, penile discharge and urgency.   Musculoskeletal: Negative for arthralgias, joint swelling and myalgias.   Skin: Negative for rash.   Neurological: Negative for dizziness, weakness, headaches  "and paresthesias.   Psychiatric/Behavioral: Negative for mood changes. The patient is not nervous/anxious.          OBJECTIVE:   /81 (BP Location: Left arm, Patient Position: Sitting, Cuff Size: Adult Regular)   Pulse 72   Temp 98.6  F (37  C) (Tympanic)   Ht 1.727 m (5' 8\")   Wt 81.6 kg (180 lb)   SpO2 98%   BMI 27.37 kg/m      Physical Exam  GENERAL: healthy, alert and no distress  EYES: Eyes grossly normal to inspection, PERRL and conjunctivae and sclerae normal  HENT: ear canals and TM's normal, nose and mouth without ulcers or lesions  NECK: no adenopathy, no asymmetry, masses, or scars and thyroid normal to palpation  RESP: lungs clear to auscultation - no rales, rhonchi or wheezes  CV: regular rate and rhythm, normal S1 S2, no S3 or S4, no murmur, click or rub, no peripheral edema and peripheral pulses strong  ABDOMEN: soft, nontender, no hepatosplenomegaly, no masses and bowel sounds normal  MS: no gross musculoskeletal defects noted, no edema  SKIN: no suspicious lesions or rashes  NEURO: Normal strength and tone, mentation intact and speech normal  PSYCH: mentation appears normal, affect normal/bright        ASSESSMENT/PLAN:       ICD-10-CM    1. Routine general medical examination at a health care facility  Z00.00 HIV Antigen Antibody Combo     Hepatitis C Screen Reflex to HCV RNA Quant and Genotype     Lipid panel reflex to direct LDL Non-fasting     Comprehensive metabolic panel (BMP + Alb, Alk Phos, ALT, AST, Total. Bili, TP)     Hemoglobin A1c        -We discussed recommendation of 150 min moderate intensity exercise /week   - We discussed the need for heart healthy diet including a diet rich in fruits, vegetables and fiber and very low on carbonated beverages sugar  - We discussed recommendation of moderation of alcohol, salt and NSAIDs.    Patient has been advised of split billing requirements and indicates understanding: Yes    COUNSELING:   Reviewed preventive health counseling, as " "reflected in patient instructions       Regular exercise       Healthy diet/nutrition       Immunizations    Vaccinated for: TDAP          Estimated body mass index is 27.37 kg/m  as calculated from the following:    Height as of this encounter: 1.727 m (5' 8\").    Weight as of this encounter: 81.6 kg (180 lb).     Weight management plan: Discussed healthy diet and exercise guidelines    He reports that he has quit smoking. His smoking use included hookah. He smoked 1.00 pack per day. He has never used smokeless tobacco.      Counseling Resources:  ATP IV Guidelines  Pooled Cohorts Equation Calculator  FRAX Risk Assessment  ICSI Preventive Guidelines  Dietary Guidelines for Americans, 2010  USDA's MyPlate  ASA Prophylaxis  Lung CA Screening    Anna Mckay MD  [facility]  "

## 2022-09-27 LAB
ALBUMIN SERPL-MCNC: 4.5 G/DL (ref 3.4–5)
ALP SERPL-CCNC: 96 U/L (ref 40–150)
ALT SERPL W P-5'-P-CCNC: 45 U/L (ref 0–70)
ANION GAP SERPL CALCULATED.3IONS-SCNC: 6 MMOL/L (ref 3–14)
AST SERPL W P-5'-P-CCNC: 22 U/L (ref 0–45)
BILIRUB SERPL-MCNC: 0.5 MG/DL (ref 0.2–1.3)
BUN SERPL-MCNC: 16 MG/DL (ref 7–30)
CALCIUM SERPL-MCNC: 9.8 MG/DL (ref 8.5–10.1)
CHLORIDE BLD-SCNC: 107 MMOL/L (ref 94–109)
CHOLEST SERPL-MCNC: 229 MG/DL
CO2 SERPL-SCNC: 29 MMOL/L (ref 20–32)
CREAT SERPL-MCNC: 0.91 MG/DL (ref 0.66–1.25)
FASTING STATUS PATIENT QL REPORTED: YES
GFR SERPL CREATININE-BSD FRML MDRD: >90 ML/MIN/1.73M2
GLUCOSE BLD-MCNC: 86 MG/DL (ref 70–99)
HDLC SERPL-MCNC: 77 MG/DL
LDLC SERPL CALC-MCNC: 131 MG/DL
NONHDLC SERPL-MCNC: 152 MG/DL
POTASSIUM BLD-SCNC: 4.1 MMOL/L (ref 3.4–5.3)
PROT SERPL-MCNC: 8.2 G/DL (ref 6.8–8.8)
SODIUM SERPL-SCNC: 142 MMOL/L (ref 133–144)
TRIGL SERPL-MCNC: 104 MG/DL

## 2022-09-28 LAB
HCV AB SERPL QL IA: NONREACTIVE
HIV 1+2 AB+HIV1 P24 AG SERPL QL IA: NONREACTIVE

## 2022-10-07 NOTE — PROGRESS NOTES
"SUBJECTIVE:  The patient presents for discussion of vasectomy.  The procedure was explained in detail using diagram from the [facility] pamphlet [facility].  The permanency and effactiveness of this operation were explained in detail, including casectomy failure rate, bleeding, infection, scarring, chronic pain and epididymititis.  The patient was told to stay at bedrest for 48-72 hours, no heavy lifting for one week and to refrain from sex for 1 week.  The patient was also told to have a post operative sperm count at 3 months.  He should have another birth control until he has a sample that is infertile.      We next discussed the risks of vasectomy. The risks discussed included:    -Bleeding at the time of the procedure or later including hematoma development.  -Infection  -Postoperative discomfort  -Development of a sperm granuloma which is a lump that can form at the site of the transection of the vas deferens.  -Sperm buildup in the testicles that may cause a sensation of congestion or discomfort. which is usually responsive to a non steroidal anti-inflammatory drug.  -Epididymitis can also occur and can cause scrotal discomfort.  -Reconnection of the vas deferans which can occur in up to 1 in 2000 cases per the literature.  -Development of sperm antibodies which may leave a man infertile despite having a reversal of the vasectomy later.  - Long term testicular discomfort which is very rare.        OBJECTIVE:  On exam, this is a well-developed, well-nourished white male.  Weight is 188 lbs 0 oz.  Height is   Ht Readings from Last 2 Encounters:   10/11/22 1.727 m (5' 8\")   09/26/22 1.727 m (5' 8\")       Exam reveals a normal circumcised penis, no lesions.  Testes are descended bilaterally. Both vas deferens were easily identified by palpation.  No abnormalities were noted. Exam was limited to the genitalia    ASSESSMENT:  Vasectomy evaluation    PLAN:  He will schedule a vasectomy at his " convenience. He was asked to wear warm layers of clothes on top and to wear warm socks.  He is aware that he will need to take several days off from work and any physical activity and essentially rest for two days with ice packs and ibuprofen for discomfort. He was also informed that he will need to bring a semen sample in 3 months to make sure that he has cleared the urinary tract  of any residual sperm and to make sure that he is sterile. He was informed that he should continue to use contraception until we have proven that he is sterile.    Tayo Balderrama PA-C

## 2022-10-11 ENCOUNTER — OFFICE VISIT (OUTPATIENT)
Dept: FAMILY MEDICINE | Facility: CLINIC | Age: 37
End: 2022-10-11
Payer: COMMERCIAL

## 2022-10-11 ENCOUNTER — TELEPHONE (OUTPATIENT)
Dept: FAMILY MEDICINE | Facility: CLINIC | Age: 37
End: 2022-10-11

## 2022-10-11 VITALS
BODY MASS INDEX: 28.49 KG/M2 | TEMPERATURE: 96 F | SYSTOLIC BLOOD PRESSURE: 124 MMHG | RESPIRATION RATE: 14 BRPM | OXYGEN SATURATION: 99 % | HEART RATE: 69 BPM | HEIGHT: 68 IN | WEIGHT: 188 LBS | DIASTOLIC BLOOD PRESSURE: 73 MMHG

## 2022-10-11 DIAGNOSIS — Z30.09 ENCOUNTER FOR VASECTOMY ASSESSMENT: Primary | ICD-10-CM

## 2022-10-11 PROCEDURE — 99213 OFFICE O/P EST LOW 20 MIN: CPT | Performed by: PHYSICIAN ASSISTANT

## 2022-10-11 ASSESSMENT — PAIN SCALES - GENERAL: PAINLEVEL: NO PAIN (0)

## 2022-10-11 NOTE — TELEPHONE ENCOUNTER
Tayo,      Scheduling patient for vasectomy, he was wondering if there was anyway we could do it on Wednesday the 23rd due to having extended time off work to heal. I told him we usually can only do Thursdays and Fridays but would send a message to see what we could do. Pt is scheduled for that day but I told him I would call him if changes need to be made so patient is aware.       Mauricio Reynolds

## 2022-10-11 NOTE — TELEPHONE ENCOUNTER
Ok to schedule when patient would like.     Try to do it right before or right after lunch.     Tayo Balderrama PA-C

## 2022-10-11 NOTE — LETTER
[facility]  [address] [facility] [address]  Phone: [phone]    October 11, 2022        Brock JAG Wood  [address] [facility] [address]          To whom it may concern:    RE: Brock T Nina    Patient was seen and treated today at our clinic and missed work.    Please contact me for questions or concerns.      Sincerely,        Tayo Balderrama PA-C

## 2022-10-11 NOTE — PATIENT INSTRUCTIONS
"SUBJECTIVE:  The patient presents for discussion of vasectomy.  The procedure was explained in detail using diagram from the [facility]ctomy pamphlet [facility].  The permanency and effactiveness of this operation were explained in detail, including casectomy failure rate, bleeding, infection, scarring, chronic pain and epididymititis.  The patient was told to stay at bedrest for 48-72 hours, no heavy lifting for one week and to refrain from sex for 1 week.  The patient was also told to have a post operative sperm count at 3 months.  He should have another birth control until he has a sample that is infertile.      We next discussed the risks of vasectomy. The risks discussed included:    -Bleeding at the time of the procedure or later including hematoma development.  -Infection  -Postoperative discomfort  -Development of a sperm granuloma which is a lump that can form at the site of the transection of the vas deferens.  -Sperm buildup in the testicles that may cause a sensation of congestion or discomfort. which is usually responsive to a non steroidal anti-inflammatory drug.  -Epididymitis can also occur and can cause scrotal discomfort.  -Reconnection of the vas deferans which can occur in up to 1 in 2000 cases per the literature.  -Development of sperm antibodies which may leave a man infertile despite having a reversal of the vasectomy later.  - Long term testicular discomfort which is very rare.        OBJECTIVE:  On exam, this is a well-developed, well-nourished white male.  Weight is 188 lbs 0 oz.  Height is   Ht Readings from Last 2 Encounters:   10/11/22 1.727 m (5' 8\")   09/26/22 1.727 m (5' 8\")       Exam reveals a normal circumcised penis, no lesions.  Testes are descended bilaterally. Both vas deferens were easily identified by palpation.  No abnormalities were noted. Exam was limited to the genitalia    ASSESSMENT:  Vasectomy evaluation    PLAN:  He will schedule a vasectomy at his " convenience. He was asked to wear warm layers of clothes on top and to wear warm socks.  He is aware that he will need to take several days off from work and any physical activity and essentially rest for two days with ice packs and ibuprofen for discomfort. He was also informed that he will need to bring a semen sample in 3 months to make sure that he has cleared the urinary tract  of any residual sperm and to make sure that he is sterile. He was informed that he should continue to use contraception until we have proven that he is sterile.    Tayo Balderrama PA-C

## 2022-12-16 ENCOUNTER — OFFICE VISIT (OUTPATIENT)
Dept: FAMILY MEDICINE | Facility: CLINIC | Age: 37
End: 2022-12-16
Payer: COMMERCIAL

## 2022-12-16 VITALS
RESPIRATION RATE: 17 BRPM | BODY MASS INDEX: 28.19 KG/M2 | HEIGHT: 68 IN | HEART RATE: 66 BPM | DIASTOLIC BLOOD PRESSURE: 61 MMHG | WEIGHT: 186 LBS | OXYGEN SATURATION: 98 % | SYSTOLIC BLOOD PRESSURE: 135 MMHG | TEMPERATURE: 97 F

## 2022-12-16 DIAGNOSIS — Z30.2 ENCOUNTER FOR VASECTOMY: Primary | ICD-10-CM

## 2022-12-16 PROCEDURE — 88302 TISSUE EXAM BY PATHOLOGIST: CPT | Performed by: PATHOLOGY

## 2022-12-16 PROCEDURE — 55250 REMOVAL OF SPERM DUCT(S): CPT | Performed by: PHYSICIAN ASSISTANT

## 2022-12-16 ASSESSMENT — PAIN SCALES - GENERAL: PAINLEVEL: NO PAIN (0)

## 2022-12-16 NOTE — PROGRESS NOTES
"SUBJECTIVE:  Brock Wood is a 36 year old male who presents for his vasectomy. The consent form was discussed and signed by the patient.  An opportunity for questions was given, these were answered to his satisfaction    OBJECTIVE/Procedure Note  /61   Pulse 66   Temp 97  F (36.1  C) (Tympanic)   Resp 17   Ht 1.727 m (5' 8\")   Wt 84.4 kg (186 lb)   SpO2 98%   BMI 28.28 kg/m      On exam, this is a well-developed, well-nourished male.     Both vas deferens were easily identified by palpation through the scrotal skin.  No abnormalities were noted.  A sterile field was created using sterile gloves,betadine and sterile drapes. The left vas deferens was palpated and brought forward to the anterior scrotal skin. Lidocaine without epinephrine was injected to create a local skin block and also higher up around the vas deferens to create a nerve block. After a minute a towel clamp was used to grasp the vas deferens through the skin. Next, an incision was made overlying the vas deferens measuring approximately 1-2 cm in length. The vas deferens was dissected away from the fascia and a loop was brought up through the incision. The vas ends where then clamped and a section excised. The loop was transected at the proximal and distal ends with electroautery. The two ends were then cauterized further and the wound was observed for bleeding. Any bleeding was controlled with electrocautery.  Attention was then turned to the right side and a similar procedure was performed. Next the incision was closed with a subcutaneous suture of 4-0 chromic.    ASSESSMENT:  Vasectomy was performed without complications.    PLAN:  He was reminded that he should  take several days off from work and any physical activity and essentially rest for two days with ice packs and ibuprofen for discomfort. He was also reminded that he will need to bring a semen sample in 3 months to make sure that he has cleared the urinary tract  of any " residual sperm and to make sure that he is sterile. He was reminded that he should continue to use contraception until we have proven that he is sterile.      Tayo Balderrama PA-C

## 2022-12-16 NOTE — PATIENT INSTRUCTIONS
Information for your Vasectomy.      Feel free to wear compression shorts/ or arthletic supporter for comfort.     Plan on very light activity for 1 wk with no lifting more that 20 lbs.     I recommend taking 2 days to recline with Icing 20 mins every couple yours.     Ok to use: Ibuprofen 600-800 mg three times daily or Aleve twice daily and/ or Tylenol 1-2 tabs po q4h as needed.    Abstain  from intercourse for about 1 wk so you can heal.    You can shower normally the next day. Just be gentle around that area.     You are not consider sterile until you leave a semen sample that doesn't contain sperm. (usually done about 3 months after vasectomy)    You will need to ejaculate at least 20 times between your surgery and the first sample.     Your may go home after procedure is complete.  There may be some pain in your groin for 3 to 4 days after the procedure.  You may have some blood or yellow liquid that loses from the incision sites.  He may have some swelling and bruising.  This is all normal.    Sutures will dissolve on their own and that can take a week or 2.    Watch for signs of infection which may include fever, pussy discharge, increased pain, swelling or redness.  Please contact us if this occurs.    Feel free to reach out to me with any questions that you have.     It is important that you rest for the first 48 hours and keep your activities minimal.        Thanks,  Tayo Balderrama PA-C

## 2022-12-20 LAB
PATH REPORT.COMMENTS IMP SPEC: NORMAL
PATH REPORT.FINAL DX SPEC: NORMAL
PATH REPORT.FINAL DX SPEC: NORMAL
PATH REPORT.GROSS SPEC: NORMAL
PATH REPORT.GROSS SPEC: NORMAL
PATH REPORT.MICROSCOPIC SPEC OTHER STN: NORMAL
PATH REPORT.MICROSCOPIC SPEC OTHER STN: NORMAL
PATH REPORT.RELEVANT HX SPEC: NORMAL
PATH REPORT.RELEVANT HX SPEC: NORMAL
PHOTO IMAGE: NORMAL
PHOTO IMAGE: NORMAL

## 2022-12-27 ENCOUNTER — OFFICE VISIT (OUTPATIENT)
Dept: FAMILY MEDICINE | Facility: CLINIC | Age: 37
End: 2022-12-27
Payer: COMMERCIAL

## 2022-12-27 VITALS
SYSTOLIC BLOOD PRESSURE: 120 MMHG | HEIGHT: 69 IN | RESPIRATION RATE: 14 BRPM | DIASTOLIC BLOOD PRESSURE: 77 MMHG | OXYGEN SATURATION: 98 % | TEMPERATURE: 96.9 F | WEIGHT: 189 LBS | BODY MASS INDEX: 27.99 KG/M2 | HEART RATE: 72 BPM

## 2022-12-27 DIAGNOSIS — Z98.52 STATUS POST VASECTOMY: ICD-10-CM

## 2022-12-27 DIAGNOSIS — L03.90 CELLULITIS, UNSPECIFIED CELLULITIS SITE: Primary | ICD-10-CM

## 2022-12-27 PROCEDURE — 99207 PR NO BILLABLE SERVICE THIS VISIT: CPT | Performed by: PHYSICIAN ASSISTANT

## 2022-12-27 RX ORDER — AZITHROMYCIN 250 MG/1
TABLET, FILM COATED ORAL
Qty: 6 TABLET | Refills: 0 | Status: SHIPPED | OUTPATIENT
Start: 2022-12-27 | End: 2023-01-01

## 2022-12-27 ASSESSMENT — PAIN SCALES - GENERAL: PAINLEVEL: NO PAIN (0)

## 2022-12-27 NOTE — PROGRESS NOTES
"  Assessment & Plan       ICD-10-CM    1. Cellulitis, unspecified cellulitis site  L03.90 azithromycin (ZITHROMAX) 250 MG tablet      2. Status post vasectomy  Z98.52         Talk to patient about his concerns believe he starting to get a cellulitis forming.  We will have him start azithromycin.  Warning signs side effects were discussed.  I encouraged him not to touch the incision sites.  He will follow-up if symptoms worsen.      Return in about 1 week (around 1/3/2023) for recheck, As Needed.    Tayo Baledrrama PA-C  [facility] is a 37 year old accompanied by his self, presenting for the following health issues:  RECHECK (Recheck vas)      History of Present Illness       Reason for visit:  Infection    He eats 4 or more servings of fruits and vegetables daily.He consumes 0 sweetened beverage(s) daily.He exercises with enough effort to increase his heart rate 60 or more minutes per day.  He exercises with enough effort to increase his heart rate 7 days per week.   He is taking medications regularly.  He is about 11 days postvasectomy.  He states procedure well he did not have much pain.  He states he is a  and has been picking at the sutures.  He has noticed some bloody whitish material that he can express from the wounds.  Denies any fevers chills or body aches he denies any pain.      Review of Systems   Constitutional, HEENT, cardiovascular, pulmonary, gi and gu systems are negative, except as otherwise noted.      Objective    /77   Pulse 72   Temp 96.9  F (36.1  C) (Tympanic)   Resp 14   Ht 1.753 m (5' 9\")   Wt 85.7 kg (189 lb)   SpO2 98%   BMI 27.91 kg/m    Body mass index is 27.91 kg/m .  Physical Exam   GENERAL: healthy, alert and no distress  Groin exam his incision site on the left side is healed well.  The one on the right shows some erythema and whitish discharge.                    "

## 2023-01-08 ENCOUNTER — HEALTH MAINTENANCE LETTER (OUTPATIENT)
Age: 38
End: 2023-01-08

## 2023-06-13 NOTE — PROGRESS NOTES
[facility] Dermatology Note  Encounter Date: Jun 14, 2023  Office Visit     Dermatology Problem List:  1. Inflamed seborrheic keratosis s/p electrodesiccation 6/14/23    ____________________________________________    Assessment & Plan:    1. Seborrheic keratosis, symptomatic - left upper arm. Based on the location and chronic irritation to this lesion, treatment with electrodesiccation is warranted. Pt is agreeable to the procedure.  - See electrodesiccation procedure note.    2. Benign lesions: Multiple benign nevi, solar lentigos, seborrheic keratoses, cherry angiomas. Explained to patient benign nature of lesion. No treatment is necessary at this time unless the lesion changes or becomes symptomatic.   - ABCDEs of melanoma were discussed and self skin checks were advised.  - Sun precaution was advised including the use of sun screens of SPF 30 or higher, sun protective clothing, and avoidance of tanning beds.    Procedures Performed:     Electrodesiccation: After verbal consent and discussion of risks and benefits were discussed with the patient including the possibility of scar and change in pigmentation, 0.5 mL of 1% lidocaine with epinephrine was injected to obtain adequate anesthesia, and 1 ISK lesions were treated with a hyfrecator on setting 11. Post treatment instructions were verbally provided.      Follow-up: prn for new or changing lesions    Staff and Scribe:     Scribe Disclosure:   I, Rosetta Townsend am serving as a scribe to document services personally performed by this physician, Dr. Jason Malcolm, based on data collection and the provider's statements to me.     Provider Disclosure:   The documentation recorded by the scribe accurately reflects the services I personally performed and the decisions made by me.    Jason Malcolm MD   of Dermatology  Department of Dermatology  [facility]  Medicine      ____________________________________________    CC: Derm Problem (Spot check under left arm. Patient declined full body skin check. No personal history of skin cancer. )    HPI:  Mr. Brock Wood is a(n) 37 year old male who presents today as a return patient for a spot check.     Last seen 4/18/22 by Dr. Morgan for a skin check.     Patient is otherwise feeling well, without additional skin concerns.    Labs Reviewed:  N/A    Physical Exam:  Vitals: There were no vitals taken for this visit.  SKIN: Waist up examination of the trunk and upper extremities was performed.  - There are tan to brown waxy stuck on papules with surrounding erythema on the left upper arm.   - There are waxy stuck on tan to brown papules on the trunk and extremities.    - Multiple regular brown pigmented macules and papules are identified on the trunk and extremities.   - No other lesions of concern on areas examined.     Medications:  Current Outpatient Medications   Medication     sildenafil (REVATIO) 20 MG tablet     No current facility-administered medications for this visit.      Past Medical History:   Patient Active Problem List   Diagnosis     CARDIOVASCULAR SCREENING; LDL GOAL LESS THAN 160     History of appendectomy     Tobacco abuse     Pilonidal cyst     Seborrheic keratoses, inflamed     Acute deep vein thrombosis (DVT) of upper extremity (H)     Past Medical History:   Diagnosis Date     Acute DVT (deep venous thrombosis) (H)      Compartment syndrome (H)      History of blood transfusion         CC No referring provider defined for this encounter. on close of this encounter.

## 2023-06-14 ENCOUNTER — OFFICE VISIT (OUTPATIENT)
Dept: DERMATOLOGY | Facility: CLINIC | Age: 38
End: 2023-06-14
Payer: COMMERCIAL

## 2023-06-14 DIAGNOSIS — L81.4 SOLAR LENTIGO: ICD-10-CM

## 2023-06-14 DIAGNOSIS — L82.1 SEBORRHEIC KERATOSIS: ICD-10-CM

## 2023-06-14 DIAGNOSIS — L82.0 INFLAMED SEBORRHEIC KERATOSIS: ICD-10-CM

## 2023-06-14 DIAGNOSIS — D22.9 MULTIPLE BENIGN NEVI: Primary | ICD-10-CM

## 2023-06-14 PROCEDURE — 17110 DESTRUCTION B9 LES UP TO 14: CPT | Performed by: DERMATOLOGY

## 2023-06-14 PROCEDURE — 99213 OFFICE O/P EST LOW 20 MIN: CPT | Mod: 25 | Performed by: DERMATOLOGY

## 2023-06-14 NOTE — LETTER
6/14/2023         RE: Brock Wood  [address]  [place] [facility] [address]        Dear Colleague,    Thank you for referring your patient, Brock Wood, to the [facility]. Please see a copy of my visit note below.    [facility] Dermatology Note  Encounter Date: Jun 14, 2023  Office Visit     Dermatology Problem List:  1. Inflamed seborrheic keratosis s/p electrodesiccation 6/14/23    ____________________________________________    Assessment & Plan:    1. Seborrheic keratosis, symptomatic - left upper arm. Based on the location and chronic irritation to this lesion, treatment with electrodesiccation is warranted. Pt is agreeable to the procedure.  - See electrodesiccation procedure note.    2. Benign lesions: Multiple benign nevi, solar lentigos, seborrheic keratoses, cherry angiomas. Explained to patient benign nature of lesion. No treatment is necessary at this time unless the lesion changes or becomes symptomatic.   - ABCDEs of melanoma were discussed and self skin checks were advised.  - Sun precaution was advised including the use of sun screens of SPF 30 or higher, sun protective clothing, and avoidance of tanning beds.    Procedures Performed:     Electrodesiccation: After verbal consent and discussion of risks and benefits were discussed with the patient including the possibility of scar and change in pigmentation, 0.5 mL of 1% lidocaine with epinephrine was injected to obtain adequate anesthesia, and 1 ISK lesions were treated with a hyfrecator on setting 11. Post treatment instructions were verbally provided.      Follow-up: prn for new or changing lesions    Staff and Scribe:     Scribe Disclosure:   I, Rosetta Townsend am serving as a scribe to document services personally performed by this physician, Dr. Jason Malcolm, based on data collection and the provider's statements to me.     Provider Disclosure:   The documentation recorded by the scribe  accurately reflects the services I personally performed and the decisions made by me.    Jason Malcolm MD   of Dermatology  Department of Dermatology  [facility] [employer] [facility]      ____________________________________________    CC: Derm Problem (Spot check under left arm. Patient declined full body skin check. No personal history of skin cancer. )    HPI:  Mr. Brock Wood is a(n) 37 year old male who presents today as a return patient for a spot check.     Last seen 4/18/22 by Dr. Morgan for a skin check.     Patient is otherwise feeling well, without additional skin concerns.    Labs Reviewed:  N/A    Physical Exam:  Vitals: There were no vitals taken for this visit.  SKIN: Waist up examination of the trunk and upper extremities was performed.  - There are tan to brown waxy stuck on papules with surrounding erythema on the left upper arm.   - There are waxy stuck on tan to brown papules on the trunk and extremities.    - Multiple regular brown pigmented macules and papules are identified on the trunk and extremities.   - No other lesions of concern on areas examined.     Medications:  Current Outpatient Medications   Medication     sildenafil (REVATIO) 20 MG tablet     No current facility-administered medications for this visit.      Past Medical History:   Patient Active Problem List   Diagnosis     CARDIOVASCULAR SCREENING; LDL GOAL LESS THAN 160     History of appendectomy     Tobacco abuse     Pilonidal cyst     Seborrheic keratoses, inflamed     Acute deep vein thrombosis (DVT) of upper extremity (H)     Past Medical History:   Diagnosis Date     Acute DVT (deep venous thrombosis) (H)      Compartment syndrome (H)      History of blood transfusion         CC No referring provider defined for this encounter. on close of this encounter.    Again, thank you for allowing me to participate in the care of your patient.        Sincerely,        Jason Malcolm MD

## 2023-06-14 NOTE — NURSING NOTE
Brock Wood's goals for this visit include:   Chief Complaint   Patient presents with     Derm Problem     Spot check under left arm. Patient declined full body skin check. No personal history of skin cancer.        He requests these members of his care team be copied on today's visit information:     [facility]    Referring Provider:  No referring provider defined for this encounter.    There were no vitals taken for this visit.    Do you need any medication refills at today's visit? Stefania Richmond CMA

## 2023-06-17 ENCOUNTER — LAB (OUTPATIENT)
Dept: LAB | Facility: CLINIC | Age: 38
End: 2023-06-17
Payer: COMMERCIAL

## 2023-06-17 DIAGNOSIS — N52.9 ERECTILE DYSFUNCTION, UNSPECIFIED ERECTILE DYSFUNCTION TYPE: ICD-10-CM

## 2023-06-17 PROCEDURE — 84270 ASSAY OF SEX HORMONE GLOBUL: CPT

## 2023-06-17 PROCEDURE — 36415 COLL VENOUS BLD VENIPUNCTURE: CPT

## 2023-06-17 PROCEDURE — 84403 ASSAY OF TOTAL TESTOSTERONE: CPT

## 2023-06-18 LAB — SHBG SERPL-SCNC: 52 NMOL/L (ref 11–80)

## 2023-06-20 LAB
TESTOST FREE SERPL-MCNC: 8.57 NG/DL
TESTOST SERPL-MCNC: 544 NG/DL (ref 240–950)

## 2023-06-25 ASSESSMENT — PATIENT HEALTH QUESTIONNAIRE - PHQ9
10. IF YOU CHECKED OFF ANY PROBLEMS, HOW DIFFICULT HAVE THESE PROBLEMS MADE IT FOR YOU TO DO YOUR WORK, TAKE CARE OF THINGS AT HOME, OR GET ALONG WITH OTHER PEOPLE: SOMEWHAT DIFFICULT
SUM OF ALL RESPONSES TO PHQ QUESTIONS 1-9: 9
SUM OF ALL RESPONSES TO PHQ QUESTIONS 1-9: 9

## 2023-06-25 ASSESSMENT — ANXIETY QUESTIONNAIRES
4. TROUBLE RELAXING: SEVERAL DAYS
1. FEELING NERVOUS, ANXIOUS, OR ON EDGE: MORE THAN HALF THE DAYS
IF YOU CHECKED OFF ANY PROBLEMS ON THIS QUESTIONNAIRE, HOW DIFFICULT HAVE THESE PROBLEMS MADE IT FOR YOU TO DO YOUR WORK, TAKE CARE OF THINGS AT HOME, OR GET ALONG WITH OTHER PEOPLE: SOMEWHAT DIFFICULT
6. BECOMING EASILY ANNOYED OR IRRITABLE: MORE THAN HALF THE DAYS
8. IF YOU CHECKED OFF ANY PROBLEMS, HOW DIFFICULT HAVE THESE MADE IT FOR YOU TO DO YOUR WORK, TAKE CARE OF THINGS AT HOME, OR GET ALONG WITH OTHER PEOPLE?: SOMEWHAT DIFFICULT
7. FEELING AFRAID AS IF SOMETHING AWFUL MIGHT HAPPEN: SEVERAL DAYS
GAD7 TOTAL SCORE: 10
3. WORRYING TOO MUCH ABOUT DIFFERENT THINGS: SEVERAL DAYS
5. BEING SO RESTLESS THAT IT IS HARD TO SIT STILL: MORE THAN HALF THE DAYS
7. FEELING AFRAID AS IF SOMETHING AWFUL MIGHT HAPPEN: SEVERAL DAYS
GAD7 TOTAL SCORE: 10
GAD7 TOTAL SCORE: 10
2. NOT BEING ABLE TO STOP OR CONTROL WORRYING: SEVERAL DAYS

## 2023-06-26 ENCOUNTER — VIRTUAL VISIT (OUTPATIENT)
Dept: BEHAVIORAL HEALTH | Facility: CLINIC | Age: 38
End: 2023-06-26
Payer: COMMERCIAL

## 2023-06-26 DIAGNOSIS — F41.1 GENERALIZED ANXIETY DISORDER: Primary | ICD-10-CM

## 2023-06-26 PROCEDURE — 90791 PSYCH DIAGNOSTIC EVALUATION: CPT | Mod: VID

## 2023-06-26 ASSESSMENT — COLUMBIA-SUICIDE SEVERITY RATING SCALE - C-SSRS
1. HAVE YOU WISHED YOU WERE DEAD OR WISHED YOU COULD GO TO SLEEP AND NOT WAKE UP?: NO
ATTEMPT LIFETIME: NO
6. HAVE YOU EVER DONE ANYTHING, STARTED TO DO ANYTHING, OR PREPARED TO DO ANYTHING TO END YOUR LIFE?: NO
TOTAL  NUMBER OF INTERRUPTED ATTEMPTS LIFETIME: NO
TOTAL  NUMBER OF ABORTED OR SELF INTERRUPTED ATTEMPTS LIFETIME: NO
2. HAVE YOU ACTUALLY HAD ANY THOUGHTS OF KILLING YOURSELF?: NO

## 2023-06-26 NOTE — Clinical Note
New to therapy, overview of services offered. Overthinking, high anxiety, childhood trauma with reactions and maladaptive behavior/thinking in adulthood. Dx MARCO ANTONIO with frequency currently at [facility]. -Nicki, [facility]
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
## 2023-06-27 ENCOUNTER — MYC MEDICAL ADVICE (OUTPATIENT)
Dept: FAMILY MEDICINE | Facility: CLINIC | Age: 38
End: 2023-06-27
Payer: COMMERCIAL

## 2023-07-14 ENCOUNTER — VIRTUAL VISIT (OUTPATIENT)
Dept: BEHAVIORAL HEALTH | Facility: CLINIC | Age: 38
End: 2023-07-14
Payer: COMMERCIAL

## 2023-07-14 DIAGNOSIS — F41.1 GENERALIZED ANXIETY DISORDER: Primary | ICD-10-CM

## 2023-07-14 PROCEDURE — 90834 PSYTX W PT 45 MINUTES: CPT | Mod: VID

## 2023-07-14 NOTE — PROGRESS NOTES
[facility] Primary [facility]: Integrated Behavioral Health  July 14, 2023      Behavioral Health Clinician Progress Note    Patient Name: Brock Wood           Service Type:  Individual      Service Location:   Face to Face in Clinic     Session Start Time: 12P  Session End Time: 12:42P      Session Length: 38 - 52      Attendees: Patient     Service Modality:  Video Visit:      Provider verified identity through the following two step process.  Patient provided:  Patient is known previously to provider    Telemedicine Visit: The patient's condition can be safely assessed and treated via synchronous audio and visual telemedicine encounter.      Reason for Telemedicine Visit: Patient has requested telehealth visit    Originating Site (Patient Location): Patient's home    Distant Site (Provider Location): [facility] MENTAL [facility] & ADDICTION [facility]    Consent:  The patient/guardian has verbally consented to: the potential risks and benefits of telemedicine (video visit) versus in person care; bill my insurance or make self-payment for services provided; and responsibility for payment of non-covered services.     Patient would like the video invitation sent by:  My Chart    Mode of Communication:  [facility]    Distant Location (Provider):  On-site    As the provider I attest to compliance with applicable laws and regulations related to telemedicine.    Visit Activities (Refresh list every visit): NEW and [facility] Only    Diagnostic Assessment Date: 6/26/23  Treatment Plan Date: July 14, 2023  PROMIS (reviewed every 90 days): 6/26/23    DATA  Extended Session (60+ minutes): No  Interactive Complexity: No  Crisis: No  [facility] Patient: No    Treatment Objective(s) Addressed in This Session:    Anxiety: will experience a reduction in anxiety, will develop more effective coping skills to manage anxiety symptoms, will develop healthy cognitive patterns and beliefs and will  increase ability to function adaptively    Progress on Treatment Objective(s) / Homework:  New Objective established this session - PREPARATION (Decided to change - considering how); Intervened by negotiating a change plan and determining options / strategies for behavior change, identifying triggers, exploring social supports, and working towards setting a date to begin behavior change     Updates: worked through avoidance vs delay (distraction from anxiety/worry/issues) relating to discomfort of feelings and trauma. Writer went through grounding techniques and relaxation strategies sent in his mychart. Reflected on how humans aren't cars in need of fixing (he is a ). Explored exercises of identifying/recognizing when he is distracting/delaying versus avoidance. Reflected on poor relationship with mother, how transactional and tenuous it seems to have become. Late Dad had a severe alcohol use problem like pt. Writer explored interpersonal relationship with mom and the family unit.     Motivational Interviewing    MI Intervention: Expressed Empathy/Understanding, Supported Autonomy, Collaboration, Evocation, Permission to raise concern or advise, Open-ended questions, Reflections: simple and complex and Reframe     Change Talk Expressed by the Patient: Desire to change Ability to change Reasons to change Need to change    Provider Response to Change Talk: E - Evoked more info from patient about behavior change, A - Affirmed patient's thoughts, decisions, or attempts at behavior change, R - Reflected patient's change talk and S - Summarized patient's change talk statements    CBT:  Discussed common cognitive distortions identified them in patient's life, Explored ways to challenge, replace, and act against these cognitions  PSYCHODYMANIC PSYCHOTHERAPY: Discussed patient's emotional dynamics and issues and how they impact behaviors,Explored patient's history of relationships and how they impact present  behaviors, Explored how to work with and make changes in these schemas and patterns  SKILLS TRAINING: Explored skills useful to client in current situation Skills include assertiveness, communication, conflict management, problem-solving, relaxation, etc.  SOLUTION FOCUSED: Explored patterns in patient's relationships and discussed options for new behaviors, Explored patterns in patient's actions and choices and discussed options for new behaviors  MINDFULLNESS-BASED-STRATEGIES:  Discussed skills based on development and application of mindfulness, Skills drawn from dialectical behavior therapy, mindfulness-based stress reduction, mindfulness-based cognitive therapy, etc.  ACCEPTANCE AND COMMITMENT THERAPY: Explored and identified important values in patient's life, Discussed ways to commit to behavioral activation around these values    Care Plan review completed: Yes    Medication Review:  No current psychiatric medications prescribed    Current Outpatient Medications   Medication     sildenafil (REVATIO) 20 MG tablet     No current facility-administered medications for this visit.        Medication Compliance:  Yes    Changes in Health Issues:   None reported    Chemical Use Review:   Substance Use: Chemical use reviewed, no active concerns identified      Tobacco Use: No current tobacco use.      Assessment: Current Emotional / Mental Status (status of significant symptoms):    Patient denies current homicidal ideation and behaviors.  Patient denies current self-injurious ideation and behaviors.    Patient denied risk behaviors associated with substance use.  Patient denies any high risk behaviors associated with mental health symptoms.  Patient reports the following current concerns for their personal safety: None.  Patient reports there are not firearms in the house.        A safety and risk management plan has not been developed at this time, however patient was encouraged to call [facility] / 911 should  there be a change in any of these risk factors.    Mental Status Exam:  Appearance:                Appropriate    Eye Contact:               Good   Psychomotor:              Normal       Gait / station:           no problem  Attitude / Demeanor:   Cooperative  Interested Friendly Pleasant Attentive Brendon  Speech      Rate / Production:   Normal/ Responsive Talkative      Volume:                   Normal  volume      Language:               intact  Mood:                          Anxious  Ambivalence  Affect:                          Appropriate    Thought Content:        Clear  Rumination   Thought Process:        Coherent  Logical       Associations:           No loosening of associations  Insight:                         Good   Judgment:                   Intact   Orientation:                 All  Attention/concentration:          Good    Diagnoses:  1. Generalized anxiety disorder      Collateral Reports Completed:  Not Applicable    Plan: (Homework, other):  Patient was given information about behavioral services and encouraged to schedule a follow up appointment with the clinic [facility] in two weeks. Writer gave pt information on boundary-setting. Challenged pt to set aside five intentional minutes of being idle, explained the different ways of feeling productive. He was also given CD Recommendations: No indications of CD issues.      RENETTA Menon, [facility] July 14, 2023      ______________________________________________________________________    Integrated Primary Care Behavioral Health Treatment Plan    Patient's Name: Brock Wood  YOB: 1985    Date of Creation: July 14, 2023  Date Treatment Plan Last Reviewed/Revised: NA    DSM5 Diagnoses:   300.02 (F41.1) Generalized Anxiety Disorder.    Psychosocial / Contextual Factors:     Functional Status:  Patient reports the following functional impairments:  management of the household and or completion of tasks, organization, relationship(s)  and social interactions.     Nonprogrammatic care:  Patient is requesting basic services to address current mental health concerns.     Clinical Summary:  1. Reason for assessment: anxiety  .  2. Psychosocial, Cultural and Contextual Factors: lives with spouse and two children. Increased anxiety/stressors; overthinking/rumination; trouble staying asleep. Causing loss of functioning interpersonally  6. Prognosis: Expect Improvement and Relieve Acute Symptoms.  7. Likely consequences of symptoms if not treated: higher level of care.  8. Client strengths include:  caring, empathetic, employed, goal-focused, good listener, motivated, open to learning, open to suggestions / feedback, responsible parent, support of family, friends and providers, supportive, wants to learn, willing to ask questions, willing to relate to others and work history .     Referral / Collaboration:  Referral to another professional/service is not indicated at this time..    Anticipated number of session for this episode of care: 4  Anticipation frequency of session: Every other week  Anticipated Duration of each session: 16-37 minutes  Treatment plan will be reviewed in 90 days or when goals have been changed.       MeasurableTreatment Goal(s) related to diagnosis / functional impairment(s)    Goal:  Patient will reduce symptoms and impacts of anxiety - generalized anxiety; effectively reduce anxiety symptoms as evidenced by a reduced GAD7 score of 5 or less with the occurrence of several days or less.    Objective #A:  will experience a reduction in anxiety, will develop  more effective coping skills to manage anxiety symptoms, will develop healthy cognitive patterns and beliefs and will increase ability to function adaptively              Client will use cognitive strategies identified in therapy to challenge anxious thoughts.  Status: NEW July 14, 2023    Objective #B:  will experience a reduction in anxiety, will develop more effective coping  skills to manage anxiety symptoms, will develop healthy cognitive patterns and beliefs and will increase ability to function adaptively  Client will use relaxation strategies many times per day to reduce the physical symptoms of anxiety.  Status: NEW July 14, 2023    Goal: Patient will show improvement in communication and interpersonal dynamics within family unit/among peers in community that are barriers to most optimal support/satisfaction.    Objective #A (Patient Action)    Patient will make at least 3 positive statements to (child, partner, etc.) per day.  Status: NEW July 14, 2023    Objective #B  Patient will identify 3 thoughts which contribute to anger or irritation.  Status: NEW July 14, 2023    Objective #C  Patient will learn & utilize at least 3 assertive communication skills weekly.  Status: NEW July 14, 2023    Objective #D  Patient will practice the use of emotional breaks.  Status: NEW July 14, 2023    Objective #E  Patient will compile a list of boundaries that they would like to set with others and practice them.  Status: NEW July 14, 2023    Objective #F  Patient will demonstrate problem solving skills by identifying the problem and generating two solutions appropriate to the situation  Status: NEW July 14, 2023    Intervention(s)  Psycho-education regarding mental health diagnoses and treatment options    Skills training    Explore skills useful to client in current situation    Skills include assertiveness, communication, conflict management, problem-solving, relaxation, etc.    Solution-Focused Therapy    Explore patterns in patient's relationships and discussed options for new behaviors    Explore patterns in patient's actions and choices and discussed options for new behaviors    Cognitive-behavioral Therapy    Discuss common cognitive distortions, identified them in patient's life    Explore ways to challenge, replace, and act against these cognitions    Acceptance and Commitment  Therapy    Explore and identified important values in patient's life    Discuss ways to commit to behavioral activation around these values    Psychodynamic psychotherapy    Discuss patient's emotional dynamics and issues and how they impact behaviors    Explore patient's history of relationships and how they impact present behaviors    Explore how to work with and make changes in these schemas and patterns    Behavioral Activation    Discuss steps patient can take to become more involved in meaningful activity    Identify barriers to these activities and explored possible solutions    Mindfulness-Based Strategies    Discuss skills based on development and application of mindfulness    Skills drawn from dialectical behavior therapy, mindfulness-based stress reduction, mindfulness-based cognitive therapy, etc.     Patient has reviewed and agreed to the above plan.      ELIZABETH KILLIAN, LICSW  July 14, 2023

## 2023-07-28 ENCOUNTER — VIRTUAL VISIT (OUTPATIENT)
Dept: BEHAVIORAL HEALTH | Facility: CLINIC | Age: 38
End: 2023-07-28
Payer: COMMERCIAL

## 2023-07-28 DIAGNOSIS — F41.1 GENERALIZED ANXIETY DISORDER: Primary | ICD-10-CM

## 2023-07-28 PROCEDURE — 90834 PSYTX W PT 45 MINUTES: CPT | Mod: VID

## 2023-07-28 NOTE — PROGRESS NOTES
[facility]: Integrated Behavioral Health  July 28th, 2023        Behavioral Health Clinician Progress Note     Patient Name: Brock Wood                                                                  Service Type:             Individual                            Service Location:       Face to Face in Clinic                 Session Start Time:  12P                  Session End Time: 12:40P                            Session Length: 38 - 52                   Attendees:     Patient                 Service Modality:  Video Visit:      Provider verified identity through the following two step process.  Patient provided:  Patient is known previously to provider     Telemedicine Visit: The patient's condition can be safely assessed and treated via synchronous audio and visual telemedicine encounter.       Reason for Telemedicine Visit: Patient has requested telehealth visit     Originating Site (Patient Location): Patient's home     Distant Site (Provider Location): [facility] MENTAL [facility] ADDICTION [facility]     Consent:  The patient/guardian has verbally consented to: the potential risks and benefits of telemedicine (video visit) versus in person care; bill my insurance or make self-payment for services provided; and responsibility for payment of non-covered services.      Patient would like the video invitation sent by:  My Chart     Mode of Communication:  [facility] Conference [facility]     Distant Location (Provider):  On-site     As the provider I attest to compliance with applicable laws and regulations related to telemedicine.     Visit Activities (Refresh list every visit): [facility] [place] Only     Diagnostic Assessment Date: 6/26/23  Treatment Plan Date: July 14, 2023  PROMIS (reviewed every 90 days): 6/26/23     DATA  Extended Session (60+ minutes): No  Interactive Complexity: No  Crisis: No  [facility] Patient: No     Treatment Objective(s) Addressed in This Session:    "  Anxiety: will experience a reduction in anxiety, will develop more effective coping skills to manage anxiety symptoms, will develop healthy cognitive patterns and beliefs and will increase ability to function adaptively     Progress on Treatment Objective(s) / Homework:  Satisfactory progress - ACTION (Actively working towards change); Intervened by reinforcing change plan / affirming steps taken      Updates: boundary settings pt reports was a good takeaway, \"think about different areas to have boundaries set, not just with immediate family, everywhere.\" Work is a big one to want to change for boundaries, brings in stress. Explored boundaries in the workplace. Writer reflected with pt on rediscovering the identity of who \"Brock\" is and included values, priorities as it relates to his self-operations and beliefs. Challenged to create a list of this self-identity elements and explore next session, working on ways to detach and differentiate self from work. 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     Motivational Interviewing     MI Intervention: Expressed Empathy/Understanding, Supported Autonomy, Collaboration, Evocation, Permission to raise concern or advise, Open-ended questions, Reflections: simple and complex and Reframe      Change Talk Expressed by the Patient: Desire to change Ability to change Reasons to change Need to change     Provider Response to Change Talk: E - Evoked more info from patient about behavior change, A - Affirmed patient's thoughts, decisions, or attempts at behavior change, R - Reflected patient's change talk and S - Summarized patient's change talk statements     CBT:  Discussed common cognitive distortions identified them in patient's life, Explored ways to challenge, replace, and act against these cognitions  PSYCHODYMANIC PSYCHOTHERAPY: Discussed patient's emotional dynamics and issues and how they " impact behaviors,Explored patient's history of relationships and how they impact present behaviors, Explored how to work with and make changes in these schemas and patterns  SKILLS TRAINING: Explored skills useful to client in current situation Skills include assertiveness, communication, conflict management, problem-solving, relaxation, etc.  SOLUTION FOCUSED: Explored patterns in patient's relationships and discussed options for new behaviors, Explored patterns in patient's actions and choices and discussed options for new behaviors  MINDFULLNESS-BASED-STRATEGIES:  Discussed skills based on development and application of mindfulness, Skills drawn from dialectical behavior therapy, mindfulness-based stress reduction, mindfulness-based cognitive therapy, etc.  ACCEPTANCE AND COMMITMENT THERAPY: Explored and identified important values in patient's life, Discussed ways to commit to behavioral activation around these values     Care Plan review completed: Yes     Medication Review:  No current psychiatric medications prescribed         Current Outpatient Medications   Medication    sildenafil (REVATIO) 20 MG tablet      No current facility-administered medications for this visit.         Medication Compliance:  Yes     Changes in Health Issues:              None reported     Chemical Use Review:              Substance Use: Chemical use reviewed, no active concerns identified                  Tobacco Use: No current tobacco use.       Assessment:  Current Emotional / Mental Status (status of significant symptoms):     Patient denies current homicidal ideation and behaviors.  Patient denies current self-injurious ideation and behaviors.    Patient denied risk behaviors associated with substance use.  Patient denies any high risk behaviors associated with mental health symptoms.  Patient reports the following current concerns for their personal safety: None.  Patient reports there are not firearms in the house.         A  safety and risk management plan has not been developed at this time, however patient was encouraged to call [place] [phone] should there be a change in any of these risk factors.     Mental Status Exam:  Appearance:                Appropriate    Eye Contact:               Good   Psychomotor:              Normal       Gait / station:           no problem  Attitude / Demeanor:   Cooperative  Interested Friendly Pleasant Attentive Brendon  Speech      Rate / Production:   Normal/ Responsive Talkative      Volume:                   Normal  volume      Language:               intact  Mood:                          Anxious  Ambivalence  Affect:                          Appropriate    Thought Content:        Clear  Rumination   Thought Process:        Coherent  Logical       Associations:           No loosening of associations  Insight:                         Good   Judgment:                   Intact   Orientation:                 All  Attention/concentration:          Good     Diagnoses:  1. Generalized anxiety disorder       Collateral Reports Completed:  Not Applicable     Plan: (Homework, other):  Patient was given information about behavioral services and encouraged to schedule a follow up appointment with the clinic [facility] in one week. Challenged to create a list of this self-identity elements and explore next session, working on ways to detach and differentiate self from work. He was also given CD Recommendations: No indications of CD issues.       FRANKY Menon, [facility]                 ______________________________________________________________________     Integrated Primary Care Behavioral Health Treatment Plan     Patient's Name: Brock Wood                    YOB: 1985     Date of Creation: July 14, 2023  Date Treatment Plan Last Reviewed/Revised: July 14th     DSM5 Diagnoses:   300.02 (F41.1) Generalized Anxiety Disorder.     Psychosocial / Contextual Factors:      Functional  Status:  Patient reports the following functional impairments:  management of the household and or completion of tasks, organization, relationship(s) and social interactions.     Nonprogrammatic care:  Patient is requesting basic services to address current mental health concerns.     Clinical Summary:  1. Reason for assessment: anxiety  .  2. Psychosocial, Cultural and Contextual Factors: lives with spouse and two children. Increased anxiety/stressors; overthinking/rumination; trouble staying asleep. Causing loss of functioning interpersonally  6. Prognosis: Expect Improvement and Relieve Acute Symptoms.  7. Likely consequences of symptoms if not treated: higher level of care.  8. Client strengths include:  caring, empathetic, employed, goal-focused, good listener, motivated, open to learning, open to suggestions / feedback, responsible parent, support of family, friends and providers, supportive, wants to learn, willing to ask questions, willing to relate to others and work history .      Referral / Collaboration:  Referral to another professional/service is not indicated at this time..     Anticipated number of session for this episode of care: 4  Anticipation frequency of session: Every other week  Anticipated Duration of each session: 16-37 minutes  Treatment plan will be reviewed in 90 days or when goals have been changed.         MeasurableTreatment Goal(s) related to diagnosis / functional impairment(s)     Goal:  Patient will reduce symptoms and impacts of anxiety - generalized anxiety; effectively reduce anxiety symptoms as evidenced by a reduced GAD7 score of 5 or less with the occurrence of several days or less.     Objective #A:  will experience a reduction in anxiety, will develop   more effective coping skills to manage anxiety symptoms, will develop healthy cognitive patterns and beliefs and will increase ability to function adaptively              Client will use cognitive strategies identified in  therapy to challenge anxious thoughts.  Status: NEW July 14, 2023     Objective #B:  will experience a reduction in anxiety, will develop more effective coping skills to manage anxiety symptoms, will develop healthy cognitive patterns and beliefs and will increase ability to function adaptively  Client will use relaxation strategies many times per day to reduce the physical symptoms of anxiety.  Status: NEW July 14, 2023     Goal: Patient will show improvement in communication and interpersonal dynamics within family unit/among peers in community that are barriers to most optimal support/satisfaction.     Objective #A (Patient Action)                          Patient will make at least 3 positive statements to (child, partner, etc.) per day.  Status: NEW July 14, 2023     Objective #B  Patient will identify 3 thoughts which contribute to anger or irritation.  Status: NEW July 14, 2023     Objective #C  Patient will learn & utilize at least 3 assertive communication skills weekly.  Status: NEW July 14, 2023     Objective #D  Patient will practice the use of emotional breaks.  Status: NEW July 14, 2023     Objective #E  Patient will compile a list of boundaries that they would like to set with others and practice them.  Status: NEW July 14, 2023     Objective #F  Patient will demonstrate problem solving skills by identifying the problem and generating two solutions appropriate to the situation  Status: NEW July 14, 2023     Intervention(s)  Psycho-education regarding mental health diagnoses and treatment options     Skills training  Explore skills useful to client in current situation  Skills include assertiveness, communication, conflict management, problem-solving, relaxation, etc.     Solution-Focused Therapy  Explore patterns in patient's relationships and discussed options for new behaviors  Explore patterns in patient's actions and choices and discussed options for new behaviors     Cognitive-behavioral  Therapy  Discuss common cognitive distortions, identified them in patient's life  Explore ways to challenge, replace, and act against these cognitions     Acceptance and Commitment Therapy  Explore and identified important values in patient's life  Discuss ways to commit to behavioral activation around these values     Psychodynamic psychotherapy  Discuss patient's emotional dynamics and issues and how they impact behaviors  Explore patient's history of relationships and how they impact present behaviors  Explore how to work with and make changes in these schemas and patterns     Behavioral Activation  Discuss steps patient can take to become more involved in meaningful activity  Identify barriers to these activities and explored possible solutions     Mindfulness-Based Strategies  Discuss skills based on development and application of mindfulness  Skills drawn from dialectical behavior therapy, mindfulness-based stress reduction, mindfulness-based cognitive therapy, etc.      Patient has reviewed and agreed to the above plan.        ELIZABETH KILLIAN, [facility]                    July 28th, 2023

## 2023-08-04 ENCOUNTER — VIRTUAL VISIT (OUTPATIENT)
Dept: BEHAVIORAL HEALTH | Facility: CLINIC | Age: 38
End: 2023-08-04
Payer: COMMERCIAL

## 2023-08-04 DIAGNOSIS — F41.1 GENERALIZED ANXIETY DISORDER: Primary | ICD-10-CM

## 2023-08-04 PROCEDURE — 90832 PSYTX W PT 30 MINUTES: CPT | Mod: VID

## 2023-08-04 NOTE — PROGRESS NOTES
[facility] Primary Care: Integrated Behavioral Health  August 4th, 2023        Behavioral Health Clinician Progress Note     Patient Name: Brock Wood                                                                  Service Type:             Individual                            Service Location:       Face to Face in Clinic                 Session Start Time:  1233P                  Session End Time: 106P                            Session Length: 16 - 37                   Attendees:     Patient                 Service Modality:  Video Visit:      Provider verified identity through the following two step process.  Patient provided:  Patient is known previously to provider     Telemedicine Visit: The patient's condition can be safely assessed and treated via synchronous audio and visual telemedicine encounter.       Reason for Telemedicine Visit: Patient has requested telehealth visit     Originating Site (Patient Location): Patient's home     Distant Site (Provider Location): [facility] MENTAL [facility] & ADDICTION [facility]     Consent:  The patient/guardian has verbally consented to: the potential risks and benefits of telemedicine (video visit) versus in person care; bill my insurance or make self-payment for services provided; and responsibility for payment of non-covered services.      Patient would like the video invitation sent by:  My Chart     Mode of Communication:  Video Conference via [facility]     Distant Location (Provider):  On-site     As the provider I attest to compliance with applicable laws and regulations related to telemedicine.     Visit Activities (Refresh list every visit): [facility] Only     Diagnostic Assessment Date: 6/26/23  Treatment Plan Date: July 14, 2023  PROMIS (reviewed every 90 days): 6/26/23     DATA  Extended Session (60+ minutes): No  Interactive Complexity: No  Crisis: No  [facility] Patient: No     Treatment Objective(s) Addressed in This Session:    "  Anxiety: will experience a reduction in anxiety, will develop more effective coping skills to manage anxiety symptoms, will develop healthy cognitive patterns and beliefs and will increase ability to function adaptively     Progress on Treatment Objective(s) / Homework:  Satisfactory progress - ACTION (Actively working towards change); Intervened by reinforcing change plan / affirming steps taken      Updates: Fabio \"was acting like a toddler\" and stomped around when pt confronted him. Pt is also being asked to move to another store and doesn't have the details. Even when problems have solutions still think about angles in case there is still stuff to do. Holds very high standards. Writer spent time exploring values clarification with writer and where the prioritized needs are to become that lens for him to make future choices and decisions. Offering hierarchy to value systems allows easier decision making as it relates to knowing where he can't infringe upon the higher value systems in his life and what takes precedence.     Motivational Interviewing     MI Intervention: Expressed Empathy/Understanding, Supported Autonomy, Collaboration, Evocation, Permission to raise concern or advise, Open-ended questions, Reflections: simple and complex and Reframe      Change Talk Expressed by the Patient: Desire to change Ability to change Reasons to change Need to change     Provider Response to Change Talk: E - Evoked more info from patient about behavior change, A - Affirmed patient's thoughts, decisions, or attempts at behavior change, R - Reflected patient's change talk and S - Summarized patient's change talk statements     CBT:  Discussed common cognitive distortions identified them in patient's life, Explored ways to challenge, replace, and act against these cognitions  PSYCHODYMANIC PSYCHOTHERAPY: Discussed patient's emotional dynamics and issues and how they impact behaviors,Explored patient's history of " relationships and how they impact present behaviors, Explored how to work with and make changes in these schemas and patterns  SKILLS TRAINING: Explored skills useful to client in current situation Skills include assertiveness, communication, conflict management, problem-solving, relaxation, etc.  SOLUTION FOCUSED: Explored patterns in patient's relationships and discussed options for new behaviors, Explored patterns in patient's actions and choices and discussed options for new behaviors  MINDFULLNESS-BASED-STRATEGIES:  Discussed skills based on development and application of mindfulness, Skills drawn from dialectical behavior therapy, mindfulness-based stress reduction, mindfulness-based cognitive therapy, etc.  ACCEPTANCE AND COMMITMENT THERAPY: Explored and identified important values in patient's life, Discussed ways to commit to behavioral activation around these values     Care Plan review completed: Yes     Medication Review:  No current psychiatric medications prescribed           Current Outpatient Medications   Medication    sildenafil (REVATIO) 20 MG tablet      No current facility-administered medications for this visit.         Medication Compliance:  Yes     Changes in Health Issues:              None reported     Chemical Use Review:              Substance Use: Chemical use reviewed, no active concerns identified                  Tobacco Use: No current tobacco use.       Assessment:  Current Emotional / Mental Status (status of significant symptoms):     Patient denies current homicidal ideation and behaviors.  Patient denies current self-injurious ideation and behaviors.    Patient denied risk behaviors associated with substance use.  Patient denies any high risk behaviors associated with mental health symptoms.  Patient reports the following current concerns for their personal safety: None.  Patient reports there are not firearms in the house.         A safety and risk management plan has not been  developed at this time, however patient was encouraged to call [place] [phone] should there be a change in any of these risk factors.     Mental Status Exam:  Appearance:                Appropriate    Eye Contact:               Good   Psychomotor:              Normal       Gait / station:           no problem  Attitude / Demeanor:   Cooperative  Interested Friendly Pleasant Attentive Brendon  Speech      Rate / Production:   Normal/ Responsive Talkative      Volume:                   Normal  volume      Language:               intact  Mood:                          Anxious  Ambivalence  Affect:                          Appropriate    Thought Content:        Clear  Rumination   Thought Process:        Coherent  Logical       Associations:           No loosening of associations  Insight:                         Good   Judgment:                   Intact   Orientation:                 All  Attention/concentration:          Good     Diagnoses:  1. Generalized anxiety disorder       Collateral Reports Completed:  Not Applicable     Plan: (Homework, other):  Patient was given information about behavioral services and encouraged to schedule a follow up appointment with the clinic [facility] [place] in one week. Writer gave pt psycho-education on values clarification. He was also given CD Recommendations: No indications of CD issues.       FRANKY Menon, [facility] [place]                 ______________________________________________________________________     Integrated Primary Care Behavioral Health Treatment Plan     Patient's Name: Brock Wood                    YOB: 1985     Date of Creation: July 14, 2023  Date Treatment Plan Last Reviewed/Revised: July 14th     DSM5 Diagnoses:   300.02 (F41.1) Generalized Anxiety Disorder.     Psychosocial / Contextual Factors:      Functional Status:  Patient reports the following functional impairments:  management of the household and or completion of tasks, organization,  relationship(s) and social interactions.     Nonprogrammatic care:  Patient is requesting basic services to address current mental health concerns.     Clinical Summary:  1. Reason for assessment: anxiety  .  2. Psychosocial, Cultural and Contextual Factors: lives with spouse and two children. Increased anxiety/stressors; overthinking/rumination; trouble staying asleep. Causing loss of functioning interpersonally  6. Prognosis: Expect Improvement and Relieve Acute Symptoms.  7. Likely consequences of symptoms if not treated: higher level of care.  8. Client strengths include:  caring, empathetic, employed, goal-focused, good listener, motivated, open to learning, open to suggestions / feedback, responsible parent, support of family, friends and providers, supportive, wants to learn, willing to ask questions, willing to relate to others and work history .      Referral / Collaboration:  Referral to another professional/service is not indicated at this time..     Anticipated number of session for this episode of care: 4  Anticipation frequency of session: Every other week  Anticipated Duration of each session: 16-37 minutes  Treatment plan will be reviewed in 90 days or when goals have been changed.         MeasurableTreatment Goal(s) related to diagnosis / functional impairment(s)     Goal:  Patient will reduce symptoms and impacts of anxiety - generalized anxiety; effectively reduce anxiety symptoms as evidenced by a reduced GAD7 score of 5 or less with the occurrence of several days or less.     Objective #A:  will experience a reduction in anxiety, will develop   more effective coping skills to manage anxiety symptoms, will develop healthy cognitive patterns and beliefs and will increase ability to function adaptively              Client will use cognitive strategies identified in therapy to challenge anxious thoughts.  Status: NEW July 14, 2023     Objective #B:  will experience a reduction in anxiety, will  develop more effective coping skills to manage anxiety symptoms, will develop healthy cognitive patterns and beliefs and will increase ability to function adaptively  Client will use relaxation strategies many times per day to reduce the physical symptoms of anxiety.  Status: NEW July 14, 2023     Goal: Patient will show improvement in communication and interpersonal dynamics within family unit/among peers in community that are barriers to most optimal support/satisfaction.     Objective #A (Patient Action)                          Patient will make at least 3 positive statements to (child, partner, etc.) per day.  Status: NEW July 14, 2023     Objective #B  Patient will identify 3 thoughts which contribute to anger or irritation.  Status: NEW July 14, 2023     Objective #C  Patient will learn & utilize at least 3 assertive communication skills weekly.  Status: NEW July 14, 2023     Objective #D  Patient will practice the use of emotional breaks.  Status: NEW July 14, 2023     Objective #E  Patient will compile a list of boundaries that they would like to set with others and practice them.  Status: NEW July 14, 2023     Objective #F  Patient will demonstrate problem solving skills by identifying the problem and generating two solutions appropriate to the situation  Status: NEW July 14, 2023     Intervention(s)  Psycho-education regarding mental health diagnoses and treatment options     Skills training  Explore skills useful to client in current situation  Skills include assertiveness, communication, conflict management, problem-solving, relaxation, etc.     Solution-Focused Therapy  Explore patterns in patient's relationships and discussed options for new behaviors  Explore patterns in patient's actions and choices and discussed options for new behaviors     Cognitive-behavioral Therapy  Discuss common cognitive distortions, identified them in patient's life  Explore ways to challenge, replace, and act against  these cognitions     Acceptance and Commitment Therapy  Explore and identified important values in patient's life  Discuss ways to commit to behavioral activation around these values     Psychodynamic psychotherapy  Discuss patient's emotional dynamics and issues and how they impact behaviors  Explore patient's history of relationships and how they impact present behaviors  Explore how to work with and make changes in these schemas and patterns     Behavioral Activation  Discuss steps patient can take to become more involved in meaningful activity  Identify barriers to these activities and explored possible solutions     Mindfulness-Based Strategies  Discuss skills based on development and application of mindfulness  Skills drawn from dialectical behavior therapy, mindfulness-based stress reduction, mindfulness-based cognitive therapy, etc.      Patient has reviewed and agreed to the above plan.        ELIZABETH KILLIAN, [facility]                    August 4th, 2023

## 2023-08-11 ENCOUNTER — VIRTUAL VISIT (OUTPATIENT)
Dept: BEHAVIORAL HEALTH | Facility: CLINIC | Age: 38
End: 2023-08-11
Payer: COMMERCIAL

## 2023-08-11 DIAGNOSIS — F41.1 GENERALIZED ANXIETY DISORDER: Primary | ICD-10-CM

## 2023-08-11 PROCEDURE — 90832 PSYTX W PT 30 MINUTES: CPT | Mod: VID

## 2023-08-11 NOTE — PROGRESS NOTES
[facility]: Integrated Behavioral Health  August 11th, 2023        Behavioral Health Clinician Progress Note     Patient Name: Brock Wood                                                                  Service Type:             Individual                            Service Location:       Face to Face in Clinic                 Session Start Time:  133P                Session End Time: 2P                            Session Length:        16 - 37                   Attendees:     Patient                 Service Modality:  Video Visit:      Provider verified identity through the following two step process.  Patient provided:  Patient is known previously to provider     Telemedicine Visit: The patient's condition can be safely assessed and treated via synchronous audio and visual telemedicine encounter.       Reason for Telemedicine Visit: Patient has requested telehealth visit     Originating Site (Patient Location): Patient's home     Distant Site (Provider Location): [facility] M[facility]     Consent:  The patient/guardian has verbally consented to: the potential risks and benefits of telemedicine (video visit) versus in person care; bill my insurance or make self-payment for services provided; and responsibility for payment of non-covered services.      Patient would like the video invitation sent by:  My Chart     Mode of Communication:  Video Conference via [facility]     Distant Location (Provider):  On-site     As the provider I attest to compliance with applicable laws and regulations related to telemedicine.     Visit Activities (Refresh list every visit): [facility] Only     Diagnostic Assessment Date: 6/26/23  Treatment Plan Date: July 14, 2023  PROMIS (reviewed every 90 days): 6/26/23     DATA  Extended Session (60+ minutes): No  Interactive Complexity: No  Crisis: No  [facility] Patient: No     Treatment Objective(s) Addressed in This Session:    "  Anxiety: will experience a reduction in anxiety, will develop more effective coping skills to manage anxiety symptoms, will develop healthy cognitive patterns and beliefs and will increase ability to function adaptively     Progress on Treatment Objective(s) / Homework:  Satisfactory progress - ACTION (Actively working towards change); Intervened by reinforcing change plan / affirming steps taken      Updates: Pt states that the week hasn't been busy. Bothering pt because he likes to be busy, lots of cleaning time. No other talks about going to another site. Anticipating school starting soon. Child started football Monday. Worry/anxiety/unknown \"is there, you know something's coming and you're trying to do something about it, but there's nothing that can be done.\" Knows work is \"gonna be a shitshow and it's gonna be dropped on us\" but they aren't saying there are any updates about transition to other site. Done what he can to go through the shop and be as prepared as possible for when the time comes. Considers trips but then has to have dog stay with someone which is tough. Pt reports sleep is still disruptive, wants to try Melatonin half of dose due to feeling like a hangover in the morning. Going to [employer] first weekend after labor day. Values clarification, hard exercise given from last session, did see work has high values.     Motivational Interviewing     MI Intervention: Expressed Empathy/Understanding, Supported Autonomy, Collaboration, Evocation, Permission to raise concern or advise, Open-ended questions, Reflections: simple and complex and Reframe      Change Talk Expressed by the Patient: Desire to change Ability to change Reasons to change Need to change     Provider Response to Change Talk: E - Evoked more info from patient about behavior change, A - Affirmed patient's thoughts, decisions, or attempts at behavior change, R - Reflected patient's change talk and S - Summarized patient's change talk " statements     CBT:  Discussed common cognitive distortions identified them in patient's life, Explored ways to challenge, replace, and act against these cognitions  PSYCHODYMANIC PSYCHOTHERAPY: Discussed patient's emotional dynamics and issues and how they impact behaviors,Explored patient's history of relationships and how they impact present behaviors, Explored how to work with and make changes in these schemas and patterns  SKILLS TRAINING: Explored skills useful to client in current situation Skills include assertiveness, communication, conflict management, problem-solving, relaxation, etc.  SOLUTION FOCUSED: Explored patterns in patient's relationships and discussed options for new behaviors, Explored patterns in patient's actions and choices and discussed options for new behaviors  MINDFULLNESS-BASED-STRATEGIES:  Discussed skills based on development and application of mindfulness, Skills drawn from dialectical behavior therapy, mindfulness-based stress reduction, mindfulness-based cognitive therapy, etc.  ACCEPTANCE AND COMMITMENT THERAPY: Explored and identified important values in patient's life, Discussed ways to commit to behavioral activation around these values     Care Plan review completed: Yes     Medication Review:  No current psychiatric medications prescribed           Current Outpatient Medications   Medication    sildenafil (REVATIO) 20 MG tablet      No current facility-administered medications for this visit.         Medication Compliance:  Yes     Changes in Health Issues:              None reported     Chemical Use Review:              Substance Use: Chemical use reviewed, no active concerns identified                  Tobacco Use: No current tobacco use.       Assessment:  Current Emotional / Mental Status (status of significant symptoms):     Patient denies current homicidal ideation and behaviors.  Patient denies current self-injurious ideation and behaviors.    Patient denied risk  behaviors associated with substance use.  Patient denies any high risk behaviors associated with mental health symptoms.  Patient reports the following current concerns for their personal safety: None.  Patient reports there are not firearms in the house.         A safety and risk management plan has not been developed at this time, however patient was encouraged to call [facility] should there be a change in any of these risk factors.     Mental Status Exam:  Appearance:                Appropriate    Eye Contact:               Good   Psychomotor:              Normal       Gait / station:           no problem  Attitude / Demeanor:   Cooperative  Interested Friendly Pleasant Attentive Brendon  Speech      Rate / Production:   Normal/ Responsive Talkative      Volume:                   Normal  volume      Language:               intact  Mood:                          Anxious  Ambivalence  Affect:                          Appropriate    Thought Content:        Clear  Rumination   Thought Process:        Coherent  Logical       Associations:           No loosening of associations  Insight:                         Good   Judgment:                   Intact   Orientation:                 All  Attention/concentration:          Good     Diagnoses:  1. Generalized anxiety disorder       Collateral Reports Completed:  Not Applicable     Plan: (Homework, other):  Patient was given information about behavioral services and encouraged to schedule a follow up appointment with the clinic [facility] in two weeks. Wants to try half of melatonin to improve sleep. He was also given CD Recommendations: No indications of CD issues.       FRANKY Menon, [facility]                 ______________________________________________________________________     Integrated Primary Care Behavioral Health Treatment Plan     Patient's Name: Brock Wood                    YOB: 1985     Date of Creation: July 14, 2023  Date  Treatment Plan Last Reviewed/Revised: July 14th     DSM5 Diagnoses:   300.02 (F41.1) Generalized Anxiety Disorder.     Psychosocial / Contextual Factors:      Functional Status:  Patient reports the following functional impairments:  management of the household and or completion of tasks, organization, relationship(s) and social interactions.     Nonprogrammatic care:  Patient is requesting basic services to address current mental health concerns.     Clinical Summary:  1. Reason for assessment: anxiety  .  2. Psychosocial, Cultural and Contextual Factors: lives with spouse and two children. Increased anxiety/stressors; overthinking/rumination; trouble staying asleep. Causing loss of functioning interpersonally  6. Prognosis: Expect Improvement and Relieve Acute Symptoms.  7. Likely consequences of symptoms if not treated: higher level of care.  8. Client strengths include:  caring, empathetic, employed, goal-focused, good listener, motivated, open to learning, open to suggestions / feedback, responsible parent, support of family, friends and providers, supportive, wants to learn, willing to ask questions, willing to relate to others and work history .      Referral / Collaboration:  Referral to another professional/service is not indicated at this time..     Anticipated number of session for this episode of care: 4  Anticipation frequency of session: Every other week  Anticipated Duration of each session: 16-37 minutes  Treatment plan will be reviewed in 90 days or when goals have been changed.         MeasurableTreatment Goal(s) related to diagnosis / functional impairment(s)     Goal:  Patient will reduce symptoms and impacts of anxiety - generalized anxiety; effectively reduce anxiety symptoms as evidenced by a reduced GAD7 score of 5 or less with the occurrence of several days or less.     Objective #A:  will experience a reduction in anxiety, will develop   more effective coping skills to manage anxiety  symptoms, will develop healthy cognitive patterns and beliefs and will increase ability to function adaptively              Client will use cognitive strategies identified in therapy to challenge anxious thoughts.  Status: CONTINUED 8/11/23     Objective #B:  will experience a reduction in anxiety, will develop more effective coping skills to manage anxiety symptoms, will develop healthy cognitive patterns and beliefs and will increase ability to function adaptively  Client will use relaxation strategies many times per day to reduce the physical symptoms of anxiety.  Status: CONTINUED 8/11/23     Goal: Patient will show improvement in communication and interpersonal dynamics within family unit/among peers in community that are barriers to most optimal support/satisfaction.     Objective #A (Patient Action)                          Patient will make at least 3 positive statements to (child, partner, etc.) per day.  Status: CONTINUED 8/11/23     Objective #B  Patient will identify 3 thoughts which contribute to anger or irritation.  Status: CONTINUED 8/11/23     Objective #C  Patient will learn & utilize at least 3 assertive communication skills weekly.  Status: CONTINUED 8/11/23     Objective #D  Patient will practice the use of emotional breaks.  Status: CONTINUED 8/11/23     Objective #E  Patient will compile a list of boundaries that they would like to set with others and practice them.  Status: CONTINUED 8/11/23     Objective #F  Patient will demonstrate problem solving skills by identifying the problem and generating two solutions appropriate to the situation  Status: CONTINUED 8/11/23     Intervention(s)  Psycho-education regarding mental health diagnoses and treatment options     Skills training  Explore skills useful to client in current situation  Skills include assertiveness, communication, conflict management, problem-solving, relaxation, etc.     Solution-Focused Therapy  Explore patterns in patient's  relationships and discussed options for new behaviors  Explore patterns in patient's actions and choices and discussed options for new behaviors     Cognitive-behavioral Therapy  Discuss common cognitive distortions, identified them in patient's life  Explore ways to challenge, replace, and act against these cognitions     Acceptance and Commitment Therapy  Explore and identified important values in patient's life  Discuss ways to commit to behavioral activation around these values     Psychodynamic psychotherapy  Discuss patient's emotional dynamics and issues and how they impact behaviors  Explore patient's history of relationships and how they impact present behaviors  Explore how to work with and make changes in these schemas and patterns     Behavioral Activation  Discuss steps patient can take to become more involved in meaningful activity  Identify barriers to these activities and explored possible solutions     Mindfulness-Based Strategies  Discuss skills based on development and application of mindfulness  Skills drawn from dialectical behavior therapy, mindfulness-based stress reduction, mindfulness-based cognitive therapy, etc.      Patient has reviewed and agreed to the above plan.        ELIZABETH KILLIAN, [facility]                    August 11th, 2023

## 2023-08-25 ENCOUNTER — VIRTUAL VISIT (OUTPATIENT)
Dept: BEHAVIORAL HEALTH | Facility: CLINIC | Age: 38
End: 2023-08-25
Payer: COMMERCIAL

## 2023-08-25 DIAGNOSIS — F41.1 GENERALIZED ANXIETY DISORDER: Primary | ICD-10-CM

## 2023-08-25 PROCEDURE — 90834 PSYTX W PT 45 MINUTES: CPT | Mod: VID

## 2023-08-25 NOTE — PROGRESS NOTES
[facility]: Integrated Behavioral Health  August 25th, 2023        Behavioral Health Clinician Progress Note     Patient Name: Brock Wood                                                                  Service Type:             Individual                            Service Location:       Face to Face in Clinic                 Session Start Time:  12P                Session End Time: 1246P                            Session Length: 38 - 52                   Attendees:     Patient                 Service Modality:  Video Visit:      Provider verified identity through the following two step process.  Patient provided:  Patient is known previously to provider     Telemedicine Visit: The patient's condition can be safely assessed and treated via synchronous audio and visual telemedicine encounter.       Reason for Telemedicine Visit: Patient has requested telehealth visit     Originating Site (Patient Location): Patient's home     Distant Site (Provider Location): [facility] MENTAL [facility] & ADDICTION [facility]     Consent:  The patient/guardian has verbally consented to: the potential risks and benefits of telemedicine (video visit) versus in person care; bill my insurance or make self-payment for services provided; and responsibility for payment of non-covered services.      Patient would like the video invitation sent by:  My Chart     Mode of Communication:  Video Conference via [facility]     Distant Location (Provider):  On-site     As the provider I attest to compliance with applicable laws and regulations related to telemedicine.     Visit Activities (Refresh list every visit): [facility] Only     Diagnostic Assessment Date: 6/26/23  Treatment Plan Date: July 14, 2023  PROMIS (reviewed every 90 days): 6/26/23     DATA  Extended Session (60+ minutes): No  Interactive Complexity: No  Crisis: No  [facility] Patient: No     Treatment Objective(s) Addressed in This Session:    "  Anxiety: will experience a reduction in anxiety, will develop more effective coping skills to manage anxiety symptoms, will develop healthy cognitive patterns and beliefs and will increase ability to function adaptively     Progress on Treatment Objective(s) / Homework:  Satisfactory progress - ACTION (Actively working towards change); Intervened by reinforcing change plan / affirming steps taken      Updates: Got a new kitten Sunday, wife's interest. Named \"Demetria.\" Wife has been asking for a couple years for one and then ended up telling him she is just getting one. 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 Fabio was frustrated as it \"made him look bad\" and pt felt he was being accountable to setting unrealistic expectations to a customer. Went through narcissistic traits and how to work with narcissism.     Motivational Interviewing     MI Intervention: Expressed Empathy/Understanding, Supported Autonomy, Collaboration, Evocation, Permission to raise concern or advise, Open-ended questions, Reflections: simple and complex and Reframe      Change Talk Expressed by the Patient: Desire to change Ability to change Reasons to change Need to change     Provider Response to Change Talk: E - Evoked more info from patient about behavior change, A - Affirmed patient's thoughts, decisions, or attempts at behavior change, R - Reflected patient's change talk and S - Summarized patient's change talk statements     CBT:  Discussed common cognitive distortions identified them in patient's life, Explored ways to challenge, replace, and act against these cognitions  PSYCHODYMANIC PSYCHOTHERAPY: Discussed patient's emotional dynamics and issues and how they impact behaviors,Explored patient's history of relationships and how they impact present behaviors, Explored how to work with and make changes in these schemas and patterns  SKILLS TRAINING: " Explored skills useful to client in current situation Skills include assertiveness, communication, conflict management, problem-solving, relaxation, etc.  SOLUTION FOCUSED: Explored patterns in patient's relationships and discussed options for new behaviors, Explored patterns in patient's actions and choices and discussed options for new behaviors  MINDFULLNESS-BASED-STRATEGIES:  Discussed skills based on development and application of mindfulness, Skills drawn from dialectical behavior therapy, mindfulness-based stress reduction, mindfulness-based cognitive therapy, etc.  ACCEPTANCE AND COMMITMENT THERAPY: Explored and identified important values in patient's life, Discussed ways to commit to behavioral activation around these values     Care Plan review completed: Yes     Medication Review:  No current psychiatric medications prescribed           Current Outpatient Medications   Medication    sildenafil (REVATIO) 20 MG tablet      No current facility-administered medications for this visit.         Medication Compliance:  Yes     Changes in Health Issues:              None reported     Chemical Use Review:              Substance Use: Chemical use reviewed, no active concerns identified                  Tobacco Use: No current tobacco use.       Assessment:  Current Emotional / Mental Status (status of significant symptoms):     Patient denies current homicidal ideation and behaviors.  Patient denies current self-injurious ideation and behaviors.    Patient denied risk behaviors associated with substance use.  Patient denies any high risk behaviors associated with mental health symptoms.  Patient reports the following current concerns for their personal safety: None.  Patient reports there are not firearms in the house.         A safety and risk management plan has not been developed at this time, however patient was encouraged to call [facility] should there be a change in any of these risk factors.      Mental Status Exam:  Appearance:                Appropriate    Eye Contact:               Good   Psychomotor:              Normal       Gait / station:           no problem  Attitude / Demeanor:   Cooperative  Interested Friendly Pleasant Attentive Brendon  Speech      Rate / Production:   Normal/ Responsive Talkative      Volume:                   Normal  volume      Language:               intact  Mood:                          Anxious  Ambivalence  Affect:                          Appropriate    Thought Content:        Clear  Rumination   Thought Process:        Coherent  Logical       Associations:           No loosening of associations  Insight:                         Good   Judgment:                   Intact   Orientation:                 All  Attention/concentration:          Good     Diagnoses:  1. Generalized anxiety disorder       Collateral Reports Completed:  Not Applicable     Plan: (Homework, other):  Patient was given information about behavioral services and encouraged to schedule a follow up appointment with the clinic [facility] in two weeks. Gave psycho-education on narcissism to better identify possible traits in his boss. He was also given CD Recommendations: No indications of CD issues.       FRANKY Menon, [facility]                 ______________________________________________________________________     Integrated Primary Care Behavioral Health Treatment Plan     Patient's Name: Brock Wood                    YOB: 1985     Date of Creation: July 14, 2023  Date Treatment Plan Last Reviewed/Revised: July 14th, 2023     DSM5 Diagnoses:   300.02 (F41.1) Generalized Anxiety Disorder.     Psychosocial / Contextual Factors:      Functional Status:  Patient reports the following functional impairments:  management of the household and or completion of tasks, organization, relationship(s) and social interactions.     Nonprogrammatic care:  Patient is requesting basic services to  address current mental health concerns.     Clinical Summary:  1. Reason for assessment: anxiety  .  2. Psychosocial, Cultural and Contextual Factors: lives with spouse and two children. Increased anxiety/stressors; overthinking/rumination; trouble staying asleep. Causing loss of functioning interpersonally  6. Prognosis: Expect Improvement and Relieve Acute Symptoms.  7. Likely consequences of symptoms if not treated: higher level of care.  8. Client strengths include:  caring, empathetic, employed, goal-focused, good listener, motivated, open to learning, open to suggestions / feedback, responsible parent, support of family, friends and providers, supportive, wants to learn, willing to ask questions, willing to relate to others and work history .      Referral / Collaboration:  Referral to another professional/service is not indicated at this time..     Anticipated number of session for this episode of care: 4  Anticipation frequency of session: Every other week  Anticipated Duration of each session: 16-37 minutes  Treatment plan will be reviewed in 90 days or when goals have been changed.         MeasurableTreatment Goal(s) related to diagnosis / functional impairment(s)     Goal:  Patient will reduce symptoms and impacts of anxiety - generalized anxiety; effectively reduce anxiety symptoms as evidenced by a reduced GAD7 score of 5 or less with the occurrence of several days or less.     Objective #A:  will experience a reduction in anxiety, will develop   more effective coping skills to manage anxiety symptoms, will develop healthy cognitive patterns and beliefs and will increase ability to function adaptively              Client will use cognitive strategies identified in therapy to challenge anxious thoughts.  Status: CONTINUED 8/11/23     Objective #B:  will experience a reduction in anxiety, will develop more effective coping skills to manage anxiety symptoms, will develop healthy cognitive patterns and  beliefs and will increase ability to function adaptively  Client will use relaxation strategies many times per day to reduce the physical symptoms of anxiety.  Status: CONTINUED 8/11/23     Goal: Patient will show improvement in communication and interpersonal dynamics within family unit/among peers in community that are barriers to most optimal support/satisfaction.     Objective #A (Patient Action)                          Patient will make at least 3 positive statements to (child, partner, etc.) per day.  Status: CONTINUED 8/11/23     Objective #B  Patient will identify 3 thoughts which contribute to anger or irritation.  Status: CONTINUED 8/11/23     Objective #C  Patient will learn & utilize at least 3 assertive communication skills weekly.  Status: CONTINUED 8/11/23     Objective #D  Patient will practice the use of emotional breaks.  Status: CONTINUED 8/11/23     Objective #E  Patient will compile a list of boundaries that they would like to set with others and practice them.  Status: CONTINUED 8/11/23     Objective #F  Patient will demonstrate problem solving skills by identifying the problem and generating two solutions appropriate to the situation  Status: CONTINUED 8/11/23     Intervention(s)  Psycho-education regarding mental health diagnoses and treatment options     Skills training  Explore skills useful to client in current situation  Skills include assertiveness, communication, conflict management, problem-solving, relaxation, etc.     Solution-Focused Therapy  Explore patterns in patient's relationships and discussed options for new behaviors  Explore patterns in patient's actions and choices and discussed options for new behaviors     Cognitive-behavioral Therapy  Discuss common cognitive distortions, identified them in patient's life  Explore ways to challenge, replace, and act against these cognitions     Acceptance and Commitment Therapy  Explore and identified important values in patient's  life  Discuss ways to commit to behavioral activation around these values     Psychodynamic psychotherapy  Discuss patient's emotional dynamics and issues and how they impact behaviors  Explore patient's history of relationships and how they impact present behaviors  Explore how to work with and make changes in these schemas and patterns     Behavioral Activation  Discuss steps patient can take to become more involved in meaningful activity  Identify barriers to these activities and explored possible solutions     Mindfulness-Based Strategies  Discuss skills based on development and application of mindfulness  Skills drawn from dialectical behavior therapy, mindfulness-based stress reduction, mindfulness-based cognitive therapy, etc.      Patient has reviewed and agreed to the above plan.        ELIZABETH KILLIAN, [facility] Psych[facility] CenterSW                    August 25th, 2023

## 2023-09-08 ENCOUNTER — VIRTUAL VISIT (OUTPATIENT)
Dept: BEHAVIORAL HEALTH | Facility: CLINIC | Age: 38
End: 2023-09-08
Payer: COMMERCIAL

## 2023-09-08 DIAGNOSIS — F41.1 GENERALIZED ANXIETY DISORDER: Primary | ICD-10-CM

## 2023-09-08 PROCEDURE — 90832 PSYTX W PT 30 MINUTES: CPT | Mod: 95
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
## 2023-09-08 NOTE — PROGRESS NOTES
[facility] Dermatology Note  Encounter Date: Sep 13, 2023  Office Visit      Dermatology Problem List:  1. Inflamed seborrheic keratosis s/p electrodesiccation 6/14/23   2. Flat warts - hands - cryo 9/13/23  Social:   ____________________________________________    Assessment & Plan:  # Flat Warts. Yong dorsal hands. R index finger x1, L dorsal hand x5  - ed on etiology  - patient to start OTC products  - cryo - today - see below    Procedures Performed:   - Cryotherapy procedure note, location(s): see above. After verbal consent and discussion of risks and benefits including, but not limited to, dyspigmentation/scar, blister, and pain, 6 lesion(s) was(were) treated with 1-2 mm freeze border for 1-2 cycles with liquid nitrogen. Post cryotherapy instructions were provided.     Follow-up: 4 week(s) in-person, or earlier for new or changing lesions    Staff:     All risks, benefits and alternatives were discussed with patient.  Patient is in agreement and understands the assessment and plan.  All questions were answered.    Molly Lyman PA-C, MPAS  [facility] [place]: Phone: [phone], Fax: [phone]  [facility]: Phone: [phone],  Fax: [phone]  [facility]: Phone: [phone], Fax: [phone]  ____________________________________________    CC: No chief complaint on file.      HPI:  Mr. Brock Wood is a 37 year old male who presents today as a return patient for bumps on hands. He notes he has tried to burn one off with various OTC methods, including applying battery acid. It seems to go away, but then eventually comes back. Also notes what he thinks are warts on the L hand.     Patient is otherwise feeling well, without additional concerns.    Labs:  none    Physical Exam:  Vitals: There were no vitals taken for this visit.  SKIN: Focused examination of face and  hands was performed.   - R index finger - verrucous papule  - L dorsal hand - 5 flat topped flesh colored verrucous papules  - No other lesions of concern on areas examined.     Medications:  Current Outpatient Medications   Medication    sildenafil (REVATIO) 20 MG tablet     No current facility-administered medications for this visit.      Past Medical/Surgical History:   Patient Active Problem List   Diagnosis    CARDIOVASCULAR SCREENING; LDL GOAL LESS THAN 160    History of appendectomy    Tobacco abuse    Pilonidal cyst    Seborrheic keratoses, inflamed    Acute deep vein thrombosis (DVT) of upper extremity (H)     Past Medical History:   Diagnosis Date    Acute DVT (deep venous thrombosis) (H)     Compartment syndrome (H)     History of blood transfusion

## 2023-09-13 ENCOUNTER — OFFICE VISIT (OUTPATIENT)
Dept: DERMATOLOGY | Facility: CLINIC | Age: 38
End: 2023-09-13
Payer: COMMERCIAL

## 2023-09-13 DIAGNOSIS — B07.8 FLAT WART: Primary | ICD-10-CM

## 2023-09-13 PROCEDURE — 17110 DESTRUCTION B9 LES UP TO 14: CPT | Performed by: PHYSICIAN ASSISTANT

## 2023-09-13 NOTE — NURSING NOTE
Brock Wood's goals for this visit include:   Chief Complaint   Patient presents with    Derm Problem     Growth in R 1st finger, has had it about a year.        He requests these members of his care team be copied on today's visit information:     PCP: [facility]    Referring Provider:  Referred Self, MD  No address on file    There were no vitals taken for this visit.    Do you need any medication refills at today's visit?         Paola Acevedo EMT

## 2023-09-13 NOTE — LETTER
9/13/2023         RE: Brock Wood  [address]   [place] [address]        Dear Colleague,    Thank you for referring your patient, Brock Wood, to the [facility]. Please see a copy of my visit note below.    [facility] Dermatology Note  Encounter Date: Sep 13, 2023  Office Visit      Dermatology Problem List:  1. Inflamed seborrheic keratosis s/p electrodesiccation 6/14/23   2. Flat warts - hands - cryo 9/13/23  Social:   ____________________________________________    Assessment & Plan:  # Flat Warts. Yong dorsal hands. R index finger x1, L dorsal hand x5  - ed on etiology  - patient to start OTC products  - cryo - today - see below    Procedures Performed:   - Cryotherapy procedure note, location(s): see above. After verbal consent and discussion of risks and benefits including, but not limited to, dyspigmentation/scar, blister, and pain, 6 lesion(s) was(were) treated with 1-2 mm freeze border for 1-2 cycles with liquid nitrogen. Post cryotherapy instructions were provided.     Follow-up: 4 week(s) in-person, or earlier for new or changing lesions    Staff:     All risks, benefits and alternatives were discussed with patient.  Patient is in agreement and understands the assessment and plan.  All questions were answered.    Molly Lyman PA-C, MPAS  [facility]: Phone: [phone], Fax: [phone]  [facility]: Phone: [phone],  Fax: [phone]  [facility]: Phone: [phone], Fax: [phone]  ____________________________________________    CC: No chief complaint on file.      HPI:  Mr. Brock Wood is a 37 year old male who presents today as a return patient for bumps on hands. He notes he has tried to burn one off with various OTC methods, including applying battery acid. It seems to go away, but then eventually comes  back. Also notes what he thinks are warts on the L hand.     Patient is otherwise feeling well, without additional concerns.    Labs:  none    Physical Exam:  Vitals: There were no vitals taken for this visit.  SKIN: Focused examination of face and hands was performed.   - R index finger - verrucous papule  - L dorsal hand - 5 flat topped flesh colored verrucous papules  - No other lesions of concern on areas examined.     Medications:  Current Outpatient Medications   Medication     sildenafil (REVATIO) 20 MG tablet     No current facility-administered medications for this visit.      Past Medical/Surgical History:   Patient Active Problem List   Diagnosis     CARDIOVASCULAR SCREENING; LDL GOAL LESS THAN 160     History of appendectomy     Tobacco abuse     Pilonidal cyst     Seborrheic keratoses, inflamed     Acute deep vein thrombosis (DVT) of upper extremity (H)     Past Medical History:   Diagnosis Date     Acute DVT (deep venous thrombosis) (H)      Compartment syndrome (H)      History of blood transfusion                         Again, thank you for allowing me to participate in the care of your patient.        Sincerely,        Molly Lyman PA-C

## 2023-09-28 ENCOUNTER — E-VISIT (OUTPATIENT)
Dept: FAMILY MEDICINE | Facility: CLINIC | Age: 38
End: 2023-09-28
Payer: COMMERCIAL

## 2023-09-28 DIAGNOSIS — R30.0 DYSURIA: Primary | ICD-10-CM

## 2023-09-28 PROCEDURE — 99207 PR NON-BILLABLE SERV PER CHARTING: CPT | Performed by: PHYSICIAN ASSISTANT

## 2023-09-29 ENCOUNTER — OFFICE VISIT (OUTPATIENT)
Dept: URGENT CARE | Facility: URGENT CARE | Age: 38
End: 2023-09-29
Payer: COMMERCIAL

## 2023-09-29 VITALS
DIASTOLIC BLOOD PRESSURE: 79 MMHG | OXYGEN SATURATION: 98 % | WEIGHT: 189.9 LBS | RESPIRATION RATE: 16 BRPM | SYSTOLIC BLOOD PRESSURE: 144 MMHG | TEMPERATURE: 97.6 F | BODY MASS INDEX: 28.04 KG/M2 | HEART RATE: 63 BPM

## 2023-09-29 DIAGNOSIS — R30.0 DYSURIA: ICD-10-CM

## 2023-09-29 DIAGNOSIS — N48.89 PAIN IN PENIS: Primary | ICD-10-CM

## 2023-09-29 LAB
ALBUMIN UR-MCNC: NEGATIVE MG/DL
APPEARANCE UR: CLEAR
BILIRUB UR QL STRIP: NEGATIVE
COLOR UR AUTO: YELLOW
GLUCOSE UR STRIP-MCNC: NEGATIVE MG/DL
HGB UR QL STRIP: NEGATIVE
KETONES UR STRIP-MCNC: NEGATIVE MG/DL
LEUKOCYTE ESTERASE UR QL STRIP: NEGATIVE
NITRATE UR QL: NEGATIVE
PH UR STRIP: 7 [PH] (ref 5–7)
SP GR UR STRIP: 1.01 (ref 1–1.03)
UROBILINOGEN UR STRIP-ACNC: 0.2 E.U./DL

## 2023-09-29 PROCEDURE — 81003 URINALYSIS AUTO W/O SCOPE: CPT | Performed by: PHYSICIAN ASSISTANT

## 2023-09-29 PROCEDURE — 99213 OFFICE O/P EST LOW 20 MIN: CPT | Performed by: PHYSICIAN ASSISTANT

## 2023-09-29 ASSESSMENT — ENCOUNTER SYMPTOMS
WHEEZING: 0
ABDOMINAL PAIN: 0
SHORTNESS OF BREATH: 0
MYALGIAS: 0
PALPITATIONS: 0
CARDIOVASCULAR NEGATIVE: 1
SORE THROAT: 0
NEUROLOGICAL NEGATIVE: 1
NAUSEA: 0
RESPIRATORY NEGATIVE: 1
HEMATURIA: 0
CHILLS: 0
MUSCULOSKELETAL NEGATIVE: 1
FEVER: 0
FREQUENCY: 0
DYSURIA: 0
CONSTITUTIONAL NEGATIVE: 1
HEADACHES: 0
GASTROINTESTINAL NEGATIVE: 1
CHEST TIGHTNESS: 0
DIARRHEA: 0
COUGH: 0
VOMITING: 0
ALLERGIC/IMMUNOLOGIC NEGATIVE: 1

## 2023-09-29 NOTE — PROGRESS NOTES
Chief Complaint:    Chief Complaint   Patient presents with    UTI     Burning at tip of penis, urine discoloration. Onset- 2 weeks      ASSESSMENT  1. Pain in penis    2. Dysuria         PLAN    Patient is in no acute distress.  He is afebrile with stable vital signs.  Urinalysis discussed with patient.  This was unremarkable for UTI.  Unclear cause of his symptoms at this time.    Order placed for follow up with urology.   Push fluids.  Try to keep any soaps away from tip of penis.     Worrisome symptoms discussed with instructions to go to the ED.  Patient verbalized understanding and agreed with this plan.    Labs:     Results for orders placed or performed in visit on 09/29/23   UA Macroscopic with reflex to Microscopic and Culture - Lab Collect     Status: Normal    Specimen: Urine, Midstream   Result Value Ref Range    Color Urine Yellow Colorless, Straw, Light Yellow, Yellow    Appearance Urine Clear Clear    Glucose Urine Negative Negative mg/dL    Bilirubin Urine Negative Negative    Ketones Urine Negative Negative mg/dL    Specific Gravity Urine 1.010 1.003 - 1.035    Blood Urine Negative Negative    pH Urine 7.0 5.0 - 7.0    Protein Albumin Urine Negative Negative mg/dL    Urobilinogen Urine 0.2 0.2, 1.0 E.U./dL    Nitrite Urine Negative Negative    Leukocyte Esterase Urine Negative Negative    Narrative    Microscopic not indicated       Problem history    Patient Active Problem List   Diagnosis    CARDIOVASCULAR SCREENING; LDL GOAL LESS THAN 160    History of appendectomy    Tobacco abuse    Pilonidal cyst    Seborrheic keratoses, inflamed    Acute deep vein thrombosis (DVT) of upper extremity (H)       Current Meds    Current Outpatient Medications:     sildenafil (REVATIO) 20 MG tablet, Take 1 to 5 pills as needed prior to intercourse on empty stomach, Disp: 30 tablet, Rfl: 1    Allergies  Allergies   Allergen Reactions    Penicillin G Anaphylaxis    Coumadin [Warfarin]         SUBJECTIVE    HPI:  Brock Wood is a 37 year old male who has symptoms of pain of the penis for 2 week(s).  The pain comes and goes and worsens with sex at times.  He has been with the same partner and is not concerned with STDs.  He denies any lesions on the penis or discharge from the penis.  No fever, chills, or hematuria.      ROS:      Review of Systems   Constitutional: Negative.  Negative for chills and fever.   HENT: Negative.  Negative for sore throat.    Respiratory: Negative.  Negative for cough, chest tightness, shortness of breath and wheezing.    Cardiovascular: Negative.  Negative for chest pain and palpitations.   Gastrointestinal: Negative.  Negative for abdominal pain, diarrhea, nausea and vomiting.   Genitourinary:  Positive for penile pain. Negative for dysuria, frequency, hematuria and urgency.   Musculoskeletal: Negative.  Negative for myalgias.   Skin:  Negative for rash.   Allergic/Immunologic: Negative.  Negative for immunocompromised state.   Neurological: Negative.  Negative for headaches.       Family History   Family History   Problem Relation Age of Onset    Asthma Mother     Thyroid Disease Father     C.A.D. Father     Heart Disease Father     Asthma Brother         Social History  Social History     Socioeconomic History    Marital status:      Spouse name: Not on file    Number of children: Not on file    Years of education: Not on file    Highest education level: Not on file   Occupational History    Not on file   Tobacco Use    Smoking status: Former     Packs/day: 1.00     Types: Hookah, Cigarettes    Smokeless tobacco: Never   Vaping Use    Vaping Use: Never used   Substance and Sexual Activity    Alcohol use: Yes     Comment: weekly    Drug use: No    Sexual activity: Yes     Partners: Female   Other Topics Concern    Parent/sibling w/ CABG, MI or angioplasty before 65F 55M? Yes   Social History Narrative    Not on file     Social Determinants of Health      Financial Resource Strain: Not on file   Food Insecurity: Not on file   Transportation Needs: Not on file   Physical Activity: Not on file   Stress: Not on file   Social Connections: Not on file   Interpersonal Safety: Not on file   Housing Stability: Not on file           OBJECTIVE     Vital signs noted and reviewed by Sergio Laura PA-C  BP (!) 144/79 (BP Location: Left arm, Patient Position: Sitting, Cuff Size: Adult Regular)   Pulse 63   Temp 97.6  F (36.4  C) (Tympanic)   Resp 16   Wt 86.1 kg (189 lb 14.4 oz)   SpO2 98%   BMI 28.04 kg/m       Physical Exam  Vitals and nursing note reviewed.   Constitutional:       General: He is not in acute distress.     Appearance: He is well-developed. He is not ill-appearing, toxic-appearing or diaphoretic.   HENT:      Head: Normocephalic and atraumatic.      Right Ear: Hearing, tympanic membrane, ear canal and external ear normal. Tympanic membrane is not perforated, erythematous, retracted or bulging.      Left Ear: Hearing, tympanic membrane, ear canal and external ear normal. Tympanic membrane is not perforated, erythematous, retracted or bulging.      Nose: Nose normal. No mucosal edema, congestion or rhinorrhea.      Mouth/Throat:      Pharynx: No oropharyngeal exudate or posterior oropharyngeal erythema.      Tonsils: No tonsillar exudate or tonsillar abscesses. 0 on the right. 0 on the left.   Eyes:      Pupils: Pupils are equal, round, and reactive to light.   Cardiovascular:      Rate and Rhythm: Normal rate and regular rhythm.      Heart sounds: Normal heart sounds, S1 normal and S2 normal. Heart sounds not distant. No murmur heard.     No friction rub. No gallop.   Pulmonary:      Effort: Pulmonary effort is normal. No respiratory distress.      Breath sounds: Normal breath sounds. No decreased breath sounds, wheezing, rhonchi or rales.   Abdominal:      General: Bowel sounds are normal. There is no distension.      Palpations: Abdomen is soft.       Tenderness: There is no abdominal tenderness.   Genitourinary:     Pubic Area: No rash.       Penis: Normal and circumcised. No erythema, tenderness, discharge, swelling or lesions.       Testes:         Right: Mass, tenderness or swelling not present.         Left: Mass, tenderness or swelling not present.      Epididymis:      Right: Not inflamed or enlarged. No mass or tenderness.      Left: Not inflamed or enlarged. No mass or tenderness.   Musculoskeletal:      Cervical back: Normal range of motion and neck supple.   Lymphadenopathy:      Cervical: No cervical adenopathy.   Skin:     General: Skin is warm and dry.      Findings: No rash.   Neurological:      Mental Status: He is alert.      Cranial Nerves: No cranial nerve deficit.   Psychiatric:         Attention and Perception: He is attentive.         Speech: Speech normal.         Behavior: Behavior normal. Behavior is cooperative.         Thought Content: Thought content normal.         Judgment: Judgment normal.             Sergio Laura PA-C  9/29/2023, 2:33 PM

## 2023-10-04 ENCOUNTER — VIRTUAL VISIT (OUTPATIENT)
Dept: BEHAVIORAL HEALTH | Facility: CLINIC | Age: 38
End: 2023-10-04
Payer: COMMERCIAL

## 2023-10-04 DIAGNOSIS — F41.1 GENERALIZED ANXIETY DISORDER: Primary | ICD-10-CM

## 2023-10-04 PROCEDURE — 90832 PSYTX W PT 30 MINUTES: CPT | Mod: 95

## 2023-10-04 NOTE — PROGRESS NOTES
[facility] Primary Care: Integrated Behavioral Health  October 4th, 2023        Behavioral Health Clinician Progress Note     Patient Name: Brock Wood                                                                  Service Type:             Individual                            Service Location:       Face to Face in Clinic                 Session Start Time:  12P                Session End Time: 1235P                            Session Length: 16 - 37                  Attendees:     Patient                 Service Modality:  Video Visit:      Provider verified identity through the following two step process.  Patient provided:  Patient is known previously to provider     Telemedicine Visit: The patient's condition can be safely assessed and treated via synchronous audio and visual telemedicine encounter.       Reason for Telemedicine Visit: Patient has requested telehealth visit     Originating Site (Patient Location): Patient's home     Distant Site (Provider Location): [facility] MENTAL [facility] & ADDICTION [facility]     Consent:  The patient/guardian has verbally consented to: the potential risks and benefits of telemedicine (video visit) versus in person care; bill my insurance or make self-payment for services provided; and responsibility for payment of non-covered services.      Patient would like the video invitation sent by:  My Chart     Mode of Communication:  Video Conference via [facility]     Distant Location (Provider):  On-site     As the provider I attest to compliance with applicable laws and regulations related to telemedicine.     Visit Activities (Refresh list every visit): [facility] Only     Diagnostic Assessment Date: 6/26/23  Treatment Plan Date: July 14, 2023  PROMIS (reviewed every 90 days): 9/28/23     PHQ2:       9/22/2023    11:56 AM 6/14/2023    11:24 AM 9/26/2022     5:02 PM 4/18/2022     2:17 PM 11/12/2018     6:00 PM 2/22/2017    10:50 AM 5/1/2012      "7:46 AM   PHQ-2 ( 1999 [employer])   Q1: Little interest or pleasure in doing things 1 0 0 0 0 0 0   Q2: Feeling down, depressed or hopeless 0 0 0 0 0 0 0   PHQ-2 Score 1 0 0 0 0 0 0   PHQ-2 Total Score (12-17 Years)- Positive if 3 or more points; Administer PHQ-A if positive     0     Q1: Little interest or pleasure in doing things Several days  Not at all       Q2: Feeling down, depressed or hopeless Not at all  Not at all       PHQ-2 Score 1  0         GAD7:       6/25/2023     3:03 PM 9/28/2023     8:06 AM   MARCO ANTONIO-7 SCORE   Total Score 10 (moderate anxiety) 9 (mild anxiety)   Total Score 10 9     PROMIS 10-Global Health (only subscores and total score):       6/25/2023     3:04 PM 9/28/2023     8:07 AM   PROMIS-10 Scores Only   Global Mental Health Score 14 13   Global Physical Health Score 18 15   PROMIS TOTAL - SUBSCORES 32 28         DATA  Extended Session (60+ minutes): No  Interactive Complexity: No  Crisis: No  [facility] Patient: No     Treatment Objective(s) Addressed in This Session:     Anxiety: will experience a reduction in anxiety, will develop more effective coping skills to manage anxiety symptoms, will develop healthy cognitive patterns and beliefs and will increase ability to function adaptively     Progress on Treatment Objective(s) / Homework:  Satisfactory progress - ACTION (Actively working towards change); Intervened by reinforcing change plan/affirming steps taken      Updates: Pt said he had an argument with leadership on Monday at his job relating to moving to a new location and being given a week notice. Pt said managers were frustrated. Pt continues to state it's impossible to move in a week. Adds that \"they want us in a building that won't even be ready in two weeks.\" Pt adds increased stressors with kids in activities, kids being sick and pulled from school. Wife continues to support pt. Pt states he didn't sleep well on Monday as he was thinking about how to manage transitioning to the new " building. Explored ways to express to leadership the incapability of moving in the short time with proof in email that they won't be ready. 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 Processed through this potential new position.     Motivational Interviewing     MI Intervention: Expressed Empathy/Understanding, Supported Autonomy, Collaboration, Evocation, Permission to raise concern or advise, Open-ended questions, Reflections: simple and complex and Reframe      Change Talk Expressed by the Patient: Desire to change Ability to change Reasons to change Need to change     Provider Response to Change Talk: E - Evoked more info from patient about behavior change, A - Affirmed patient's thoughts, decisions, or attempts at behavior change, R - Reflected patient's change talk and S - Summarized patient's change talk statements     CBT:  Discussed common cognitive distortions identified them in patient's life, Explored ways to challenge, replace, and act against these cognitions  PSYCHODYMANIC PSYCHOTHERAPY: Discussed patient's emotional dynamics and issues and how they impact behaviors,Explored patient's history of relationships and how they impact present behaviors, Explored how to work with and make changes in these schemas and patterns  SKILLS TRAINING: Explored skills useful to client in current situation Skills include assertiveness, communication, conflict management, problem-solving, relaxation, etc.  SOLUTION FOCUSED: Explored patterns in patient's relationships and discussed options for new behaviors, Explored patterns in patient's actions and choices and discussed options for new behaviors  MINDFULLNESS-BASED-STRATEGIES:  Discussed skills based on development and application of mindfulness, Skills drawn from dialectical behavior therapy, mindfulness-based stress reduction, mindfulness-based cognitive therapy, etc.  ACCEPTANCE AND COMMITMENT THERAPY: Explored and identified important values in  patient's life, Discussed ways to commit to behavioral activation around these values     Care Plan review completed: Yes     Medication Review:  No current psychiatric medications prescribed           Current Outpatient Medications   Medication    sildenafil (REVATIO) 20 MG tablet      No current facility-administered medications for this visit.         Medication Compliance:  Yes     Changes in Health Issues:              None reported     Chemical Use Review:              Substance Use: Chemical use reviewed, no active concerns identified                  Tobacco Use: No current tobacco use.       Assessment:  Current Emotional / Mental Status (status of significant symptoms):     Patient denies current homicidal ideation and behaviors.  Patient denies current self-injurious ideation and behaviors.    Patient denied risk behaviors associated with substance use.  Patient denies any high risk behaviors associated with mental health symptoms.  Patient reports the following current concerns for their personal safety: None.  Patient reports there are not firearms in the house.         A safety and risk management plan has not been developed at this time, however patient was encouraged to call [facility] should there be a change in any of these risk factors.     Mental Status Exam:  Appearance:                Appropriate    Eye Contact:               Good   Psychomotor:              Normal       Gait / station:           no problem  Attitude / Demeanor:   Cooperative  Interested Friendly Pleasant Attentive Brendon  Speech      Rate / Production:   Normal/ Responsive Talkative      Volume:                   Normal  volume      Language:               intact  Mood:                          Anxious  Ambivalence  Affect:                          Appropriate    Thought Content:        Clear  Rumination   Thought Process:        Coherent  Logical       Associations:           No loosening of associations  Insight:                          Good   Judgment:                   Intact   Orientation:                 All  Attention/concentration:          Good     Diagnoses:  1. Generalized anxiety disorder       Collateral Reports Completed:  Not Applicable     Plan: (Homework, other):  Patient was given information about behavioral services and encouraged to schedule a follow up appointment with the clinic [facility] in two weeks. Pt was encouraged to know what his limits are, assert them and accept where is not in control and find healthy semblances of control when he feels more helpless. He was also given CD Recommendations: No indications of CD issues.       Nicki Manuel, FRANKY, [facility]                 ______________________________________________________________________     Integrated Primary Care Behavioral Health Treatment Plan     Patient's Name: Brock Wood                    YOB: 1985     Date of Creation: July 14, 2023  Date Treatment Plan Last Reviewed/Revised: 8/11/23     DSM5 Diagnoses:   300.02 (F41.1) Generalized Anxiety Disorder.     Psychosocial / Contextual Factors:      Functional Status:  Patient reports the following functional impairments:  management of the household and or completion of tasks, organization, relationship(s) and social interactions.     Nonprogrammatic care:  Patient is requesting basic services to address current mental health concerns.     Clinical Summary:  1. Reason for assessment: anxiety  .  2. Psychosocial, Cultural and Contextual Factors: lives with spouse and two children. Increased anxiety/stressors; overthinking/rumination; trouble staying asleep. Causing loss of functioning interpersonally  6. Prognosis: Expect Improvement and Relieve Acute Symptoms.  7. Likely consequences of symptoms if not treated: higher level of care.  8. Client strengths include:  caring, empathetic, employed, goal-focused, good listener, motivated, open to learning, open to suggestions / feedback,  responsible parent, support of family, friends and providers, supportive, wants to learn, willing to ask questions, willing to relate to others and work history .      Referral / Collaboration:  Referral to another professional/service is not indicated at this time..     Anticipated number of session for this episode of care: 4  Anticipation frequency of session: Every other week  Anticipated Duration of each session: 16-37 minutes  Treatment plan will be reviewed in 90 days or when goals have been changed.         MeasurableTreatment Goal(s) related to diagnosis / functional impairment(s)     Goal:  Patient will reduce symptoms and impacts of anxiety - generalized anxiety; effectively reduce anxiety symptoms as evidenced by a reduced GAD7 score of 5 or less with the occurrence of several days or less.     Objective #A:  will experience a reduction in anxiety, will develop   more effective coping skills to manage anxiety symptoms, will develop healthy cognitive patterns and beliefs and will increase ability to function adaptively              Client will use cognitive strategies identified in therapy to challenge anxious thoughts.  Status: CONTINUED 8/11/23     Objective #B:  will experience a reduction in anxiety, will develop more effective coping skills to manage anxiety symptoms, will develop healthy cognitive patterns and beliefs and will increase ability to function adaptively  Client will use relaxation strategies many times per day to reduce the physical symptoms of anxiety.  Status: CONTINUED 8/11/23     Goal: Patient will show improvement in communication and interpersonal dynamics within family unit/among peers in community that are barriers to most optimal support/satisfaction.     Objective #A (Patient Action)                          Patient will make at least 3 positive statements to (child, partner, etc.) per day.  Status: CONTINUED 8/11/23     Objective #B  Patient will identify 3 thoughts which  contribute to anger or irritation.  Status: CONTINUED 8/11/23     Objective #C  Patient will learn & utilize at least 3 assertive communication skills weekly.  Status: CONTINUED 8/11/23     Objective #D  Patient will practice the use of emotional breaks.  Status: CONTINUED 8/11/23     Objective #E  Patient will compile a list of boundaries that they would like to set with others and practice them.  Status: CONTINUED 8/11/23     Objective #F  Patient will demonstrate problem solving skills by identifying the problem and generating two solutions appropriate to the situation  Status: CONTINUED 8/11/23     Intervention(s)  Psycho-education regarding mental health diagnoses and treatment options     Skills training  Explore skills useful to client in current situation  Skills include assertiveness, communication, conflict management, problem-solving, relaxation, etc.     Solution-Focused Therapy  Explore patterns in patient's relationships and discussed options for new behaviors  Explore patterns in patient's actions and choices and discussed options for new behaviors     Cognitive-behavioral Therapy  Discuss common cognitive distortions, identified them in patient's life  Explore ways to challenge, replace, and act against these cognitions     Acceptance and Commitment Therapy  Explore and identified important values in patient's life  Discuss ways to commit to behavioral activation around these values     Psychodynamic psychotherapy  Discuss patient's emotional dynamics and issues and how they impact behaviors  Explore patient's history of relationships and how they impact present behaviors  Explore how to work with and make changes in these schemas and patterns     Behavioral Activation  Discuss steps patient can take to become more involved in meaningful activity  Identify barriers to these activities and explored possible solutions     Mindfulness-Based Strategies  Discuss skills based on development and application  of mindfulness  Skills drawn from dialectical behavior therapy, mindfulness-based stress reduction, mindfulness-based cognitive therapy, etc.      Patient has reviewed and agreed to the above plan.        ELIZABETH KILLIAN, [facility] CenterSW                    October 4th, 2023

## 2023-11-18 ENCOUNTER — LAB (OUTPATIENT)
Dept: LAB | Facility: CLINIC | Age: 38
End: 2023-11-18
Payer: COMMERCIAL

## 2023-11-18 DIAGNOSIS — Z30.2 ENCOUNTER FOR VASECTOMY: ICD-10-CM

## 2023-12-01 LAB — SEMEN ANALYSIS P VAS PNL: NORMAL

## 2023-12-01 PROCEDURE — 89321 SEMEN ANAL SPERM DETECTION: CPT

## 2023-12-02 ENCOUNTER — HEALTH MAINTENANCE LETTER (OUTPATIENT)
Age: 38
End: 2023-12-02

## 2024-05-01 ENCOUNTER — OFFICE VISIT (OUTPATIENT)
Dept: DERMATOLOGY | Facility: CLINIC | Age: 39
End: 2024-05-01
Payer: COMMERCIAL

## 2024-05-01 DIAGNOSIS — L81.4 LENTIGINES: ICD-10-CM

## 2024-05-01 DIAGNOSIS — D18.01 CHERRY ANGIOMA: ICD-10-CM

## 2024-05-01 DIAGNOSIS — L82.1 SEBORRHEIC KERATOSIS: ICD-10-CM

## 2024-05-01 DIAGNOSIS — L82.1 SK (SEBORRHEIC KERATOSIS): Primary | ICD-10-CM

## 2024-05-01 DIAGNOSIS — D22.9 MULTIPLE BENIGN NEVI: ICD-10-CM

## 2024-05-01 PROCEDURE — 99213 OFFICE O/P EST LOW 20 MIN: CPT | Performed by: STUDENT IN AN ORGANIZED HEALTH CARE EDUCATION/TRAINING PROGRAM

## 2024-05-01 NOTE — NURSING NOTE
Brock Wood's goals for this visit include:   Chief Complaint   Patient presents with    Skin Check     Patient here for yearly skin check, areas of concern shoulders and back, no family hx of skin cancer        He requests these members of his care team be copied on today's visit information:     [facility]    Referring Provider:  Referred Self, MD  No address on file    There were no vitals taken for this visit.    Do you need any medication refills at today's visit?       Paola Acevedo EMT

## 2024-05-01 NOTE — PATIENT INSTRUCTIONS

## 2024-05-01 NOTE — PROGRESS NOTES
[facility] Dermatology Note  Encounter Date: May 1, 2024  Office Visit     Reviewed patients past medical history and pertinent chart review prior to patients visit today.     Dermatology Problem List:  Last skin check: 5/1/2024  1. Inflamed seborrheic keratosis s/p electrodesiccation 6/14/23   2. Flat warts - hands - cryo 9/13/23  Social:     Personal Hx: no personal history of skin cancer  Family Hx: no family hx of skin cancer   _________________________________________    Assessment & Plan:     # Benign skin findings including: seborrheic keratoses, cherry angioma, lentigines and benign nevi.   - No further intervention required. Patient to report changes.   - Patient reassured of the benign nature of these lesions.    #Signs and Symptoms of non-melanoma skin cancer and ABCDEs of melanoma reviewed with patient. Patient encouraged to perform monthly self skin exams and educated on how to perform them. UV precautions reviewed with patient. Patient was asked about new or changing moles/lesions on body.     #Reviewed Sunscreen: Apply 20 minutes prior to going outdoors and reapply every two hours, when wet or sweating. We recommend using an SPF 30 or higher, and to use one that is water resistant.       Follow-up:  2 year(s) for follow up full body skin exam, prn for new or changing lesions or new concerns    Sumaya Johnson PA-C  [facility]  Dermatology     ____________________________________________    CC: Skin Check (Patient here for yearly skin check, areas of concern shoulders and back, no family hx of skin cancer )    HPI:  Mr. Brock Wood is a(n) 38 year old male who presents today as a return patient for a full body skin cancer screening. Patient has concerns today about shoulders and back. Lesions are asymptomatic. Denies being diligent with photoprotection.     Patient is otherwise feeling well, without additional skin concerns.     Physical Exam:  Vitals: There were  no vitals taken for this visit.  SKIN: Total skin excluding the genitalia areas was performed. The exam included the head/face, neck, both arms, chest, back, abdomen, both legs, digits, buttock and nails.   -several 1-2mm red dome shaped symmetric papules scattered on the trunk  -multiple tan/brown flat round macules and raised papules scattered throughout trunk, extremities and head. No worrisome features for malignancy noted on examination.  -scattered tan, homogenous macules scattered on sun exposed areas of trunk, extremities and face.   -scattered waxy, stuck on tan/brown papules and patches on the trunk     - No other lesions of concern on areas examined.     Medications:  Current Outpatient Medications   Medication Sig Dispense Refill    sildenafil (REVATIO) 20 MG tablet Take 1 to 5 pills as needed prior to intercourse on empty stomach 30 tablet 1     No current facility-administered medications for this visit.      Past Medical History:   Patient Active Problem List   Diagnosis    CARDIOVASCULAR SCREENING; LDL GOAL LESS THAN 160    History of appendectomy    Tobacco abuse    Pilonidal cyst    Seborrheic keratoses, inflamed    Acute deep vein thrombosis (DVT) of upper extremity (H)     Past Medical History:   Diagnosis Date    Acute DVT (deep venous thrombosis) (H)     Compartment syndrome (H)     History of blood transfusion        CC Referred Self, MD  No address on file on close of this encounter.

## 2024-05-01 NOTE — LETTER
5/1/2024         RE: Brock Wood  [address]  [place] [address]        Dear Colleague,    Thank you for referring your patient, Brock Wood, to the [facility] [place]. Please see a copy of my visit note below.    [facility] Dermatology Note  Encounter Date: May 1, 2024  Office Visit     Reviewed patients past medical history and pertinent chart review prior to patients visit today.     Dermatology Problem List:  Last skin check: 5/1/2024  1. Inflamed seborrheic keratosis s/p electrodesiccation 6/14/23   2. Flat warts - hands - cryo 9/13/23  Social:     Personal Hx: no personal history of skin cancer  Family Hx: no family hx of skin cancer   _________________________________________    Assessment & Plan:     # Benign skin findings including: seborrheic keratoses, cherry angioma, lentigines and benign nevi.   - No further intervention required. Patient to report changes.   - Patient reassured of the benign nature of these lesions.    #Signs and Symptoms of non-melanoma skin cancer and ABCDEs of melanoma reviewed with patient. Patient encouraged to perform monthly self skin exams and educated on how to perform them. UV precautions reviewed with patient. Patient was asked about new or changing moles/lesions on body.     #Reviewed Sunscreen: Apply 20 minutes prior to going outdoors and reapply every two hours, when wet or sweating. We recommend using an SPF 30 or higher, and to use one that is water resistant.       Follow-up:  2 year(s) for follow up full body skin exam, prn for new or changing lesions or new concerns    Sumaya Johnson PA-C  [facility]  Dermatology     ____________________________________________    CC: Skin Check (Patient here for yearly skin check, areas of concern shoulders and back, no family hx of skin cancer )    HPI:  Mr. Brock Wood is a(n) 38 year old male who presents today as a return patient for a full body skin cancer  screening. Patient has concerns today about shoulders and back. Lesions are asymptomatic. Denies being diligent with photoprotection.     Patient is otherwise feeling well, without additional skin concerns.     Physical Exam:  Vitals: There were no vitals taken for this visit.  SKIN: Total skin excluding the genitalia areas was performed. The exam included the head/face, neck, both arms, chest, back, abdomen, both legs, digits, buttock and nails.   -several 1-2mm red dome shaped symmetric papules scattered on the trunk  -multiple tan/brown flat round macules and raised papules scattered throughout trunk, extremities and head. No worrisome features for malignancy noted on examination.  -scattered tan, homogenous macules scattered on sun exposed areas of trunk, extremities and face.   -scattered waxy, stuck on tan/brown papules and patches on the trunk     - No other lesions of concern on areas examined.     Medications:  Current Outpatient Medications   Medication Sig Dispense Refill     sildenafil (REVATIO) 20 MG tablet Take 1 to 5 pills as needed prior to intercourse on empty stomach 30 tablet 1     No current facility-administered medications for this visit.      Past Medical History:   Patient Active Problem List   Diagnosis     CARDIOVASCULAR SCREENING; LDL GOAL LESS THAN 160     History of appendectomy     Tobacco abuse     Pilonidal cyst     Seborrheic keratoses, inflamed     Acute deep vein thrombosis (DVT) of upper extremity (H)     Past Medical History:   Diagnosis Date     Acute DVT (deep venous thrombosis) (H)      Compartment syndrome (H)      History of blood transfusion        CC Referred Self, MD  No address on file on close of this encounter.       Again, thank you for allowing me to participate in the care of your patient.        Sincerely,        Sumaya Johnson PA-C

## 2024-09-01 ENCOUNTER — E-VISIT (OUTPATIENT)
Dept: FAMILY MEDICINE | Facility: CLINIC | Age: 39
End: 2024-09-01
Payer: COMMERCIAL

## 2024-09-01 DIAGNOSIS — N52.9 ERECTILE DYSFUNCTION, UNSPECIFIED ERECTILE DYSFUNCTION TYPE: Primary | ICD-10-CM

## 2024-09-01 PROCEDURE — 99207 PR NO BILLABLE SERVICE THIS VISIT: CPT | Performed by: FAMILY MEDICINE

## 2024-09-02 NOTE — PATIENT INSTRUCTIONS
Thank you for choosing us for your care. I think an in-clinic or virtual visit would be the best next step based on your symptoms. Please schedule a clinic appointment; you won t be charged for this eVisit.      You can schedule an appointment by clicking here in [email or web address], or call [phone].

## 2024-09-03 ENCOUNTER — VIRTUAL VISIT (OUTPATIENT)
Dept: FAMILY MEDICINE | Facility: CLINIC | Age: 39
End: 2024-09-03
Payer: COMMERCIAL

## 2024-09-03 DIAGNOSIS — N52.9 ERECTILE DYSFUNCTION, UNSPECIFIED ERECTILE DYSFUNCTION TYPE: Primary | ICD-10-CM

## 2024-09-03 DIAGNOSIS — Z86.718 PERSONAL HISTORY OF DVT (DEEP VEIN THROMBOSIS): ICD-10-CM

## 2024-09-03 PROCEDURE — 99213 OFFICE O/P EST LOW 20 MIN: CPT | Mod: 95 | Performed by: PHYSICIAN ASSISTANT

## 2024-09-03 PROCEDURE — G2211 COMPLEX E/M VISIT ADD ON: HCPCS | Mod: 95 | Performed by: PHYSICIAN ASSISTANT

## 2024-09-03 RX ORDER — TADALAFIL 10 MG/1
10 TABLET ORAL DAILY PRN
Qty: 30 TABLET | Refills: 1 | Status: SHIPPED | OUTPATIENT
Start: 2024-09-03

## 2024-09-03 NOTE — PROGRESS NOTES
Brock is a 38 year old who is being evaluated via a billable video visit.    What phone number would you like to be contacted at? [phone]  How would you like to obtain your AVS? MyChart      Assessment & Plan       ICD-10-CM    1. Erectile dysfunction, unspecified erectile dysfunction type  N52.9 tadalafil (CIALIS) 10 MG tablet      2. Personal history of DVT (deep vein thrombosis)  Z86.718       Talk to patient about his concerns.  Will start Cialis.  Warning signs side effects were discussed.  Follow-up in 4 weeks as needed.  If symptoms persist may consider follow-up with urology for second opinion.    The longitudinal plan of care for the diagnosis(es)/condition(s) as documented were addressed during this visit. Due to the added complexity in care, I will continue to support Brock in the subsequent management and with ongoing continuity of care.  Subjective   Brock is a 38 year old, presenting for the following health issues:  Erectile Dysfunction        9/3/2024     9:11 AM   Additional Questions   Roomed by YOLY HOOD   Accompanied by SELF (VIRTUAL)     History of Present Illness       Reason for visit:  Ed problem  Symptom onset:  More than a month  Symptoms include:  Ummmm  Symptom intensity:  Moderate  Symptom progression:  Staying the same  Had these symptoms before:  Yes  Has tried/received treatment for these symptoms:  No   He is taking medications regularly.   Off and on symptoms for years.   History of ETOH abuse and stopped that and symptoms improved.   Increase in the last 2 yrs.   Neg labs in the past.   Increase anxiety about this.       Review of Systems  Constitutional, HEENT, cardiovascular, pulmonary, gi and gu systems are negative, except as otherwise noted.      Objective           Vitals:  No vitals were obtained today due to virtual visit.    Physical Exam   GENERAL: alert and no distress  EYES: Eyes grossly normal to inspection.  No discharge or erythema, or obvious  scleral/conjunctival abnormalities.  RESP: No audible wheeze, cough, or visible cyanosis.    SKIN: Visible skin clear. No significant rash, abnormal pigmentation or lesions.  NEURO: Cranial nerves grossly intact.  Mentation and speech appropriate for age.  PSYCH: Appropriate affect, tone, and pace of words        Video-Visit Details    Type of service:  Video Visit   Originating Location (pt. Location): Other car    Distant Location (provider location):  Off-site  Platform used for Video Visit: [facility]  Signed Electronically by: Tayo Balderrama PA-C

## 2025-01-05 ENCOUNTER — HEALTH MAINTENANCE LETTER (OUTPATIENT)
Age: 40
End: 2025-01-05

## 2025-02-19 ENCOUNTER — OFFICE VISIT (OUTPATIENT)
Dept: FAMILY MEDICINE | Facility: CLINIC | Age: 40
End: 2025-02-19
Payer: COMMERCIAL

## 2025-02-19 VITALS
TEMPERATURE: 97 F | HEIGHT: 71 IN | BODY MASS INDEX: 27.16 KG/M2 | DIASTOLIC BLOOD PRESSURE: 75 MMHG | SYSTOLIC BLOOD PRESSURE: 124 MMHG | HEART RATE: 69 BPM | RESPIRATION RATE: 16 BRPM | WEIGHT: 194 LBS | OXYGEN SATURATION: 100 %

## 2025-02-19 DIAGNOSIS — B07.0 PLANTAR WARTS: Primary | ICD-10-CM

## 2025-02-19 PROCEDURE — 99213 OFFICE O/P EST LOW 20 MIN: CPT | Performed by: PHYSICIAN ASSISTANT

## 2025-02-19 RX ORDER — IMIQUIMOD 12.5 MG/.25G
CREAM TOPICAL
Qty: 12 PACKET | Refills: 3 | Status: SHIPPED | OUTPATIENT
Start: 2025-02-19

## 2025-02-19 ASSESSMENT — PAIN SCALES - GENERAL: PAINLEVEL_OUTOF10: MODERATE PAIN (5)

## 2025-02-19 NOTE — PROGRESS NOTES
"  Assessment & Plan       ICD-10-CM    1. Plantar warts  B07.0 imiquimod (ALDARA) 5 % external cream      Talk to patient about his concerns.  I believe he has a plantars wart.  I did pare this down.  He is going to check with insurance for cost for treatment in the clinic otherwise I did prescribe him a Aldara or he could use over-the-counter wart stick.  Warning signs and side effects were discussed.  Recheck in a couple months as needed.      Chapo Gutiérrez is a 39 year old, presenting for the following health issues:  Musculoskeletal Problem        2/19/2025    11:11 AM   Additional Questions   Roomed by lincoln   Accompanied by self         2/19/2025    11:11 AM   Patient Reported Additional Medications   Patient reports taking the following new medications no     History of Present Illness       Reason for visit:  Foot pain  Symptom onset:  More than a month  Symptoms include:  Pain  Symptom intensity:  Moderate  Symptom progression:  Staying the same  Had these symptoms before:  No  What makes it worse:  No  What makes it better:  No   He is taking medications regularly.   Possibly stepped on glasses back in November.   Tried to dig it out himself.   Pain with walking on foot.     Review of Systems  Constitutional, HEENT, cardiovascular, pulmonary, gi and gu systems are negative, except as otherwise noted.      Objective    /75   Pulse 69   Temp 97  F (36.1  C) (Tympanic)   Resp 16   Ht 1.791 m (5' 10.5\")   Wt 88 kg (194 lb)   SpO2 100%   BMI 27.44 kg/m    Body mass index is 27.44 kg/m .  Physical Exam   GENERAL: alert and no distress  Bottom of his right heel he has about a 5 mm warty lesion.  Nontender.          Signed Electronically by: Tayo Balderrama PA-C    "

## 2025-04-09 ENCOUNTER — OFFICE VISIT (OUTPATIENT)
Dept: PODIATRY | Facility: CLINIC | Age: 40
End: 2025-04-09
Payer: COMMERCIAL

## 2025-04-09 ENCOUNTER — MYC REFILL (OUTPATIENT)
Dept: FAMILY MEDICINE | Facility: CLINIC | Age: 40
End: 2025-04-09

## 2025-04-09 VITALS — HEIGHT: 71 IN | WEIGHT: 192 LBS | BODY MASS INDEX: 26.88 KG/M2

## 2025-04-09 DIAGNOSIS — B07.0 VERRUCA PLANTARIS: Primary | ICD-10-CM

## 2025-04-09 DIAGNOSIS — B07.0 PLANTAR WARTS: ICD-10-CM

## 2025-04-09 NOTE — PROGRESS NOTES
Subjective:    Pt is seen today as a new pt referral with the c/c of painful lesion on the plantar aspect right heel.  This has been problematic for 7 month(s).  Pain is aggrevated by activity and relieved by rest.  Describes as burning pain and this is slowly getting worse.  Has tried OTC products without alleviation of lesion/symptoms.  He is wondering if this is from a foreign body.  He works as an .  Recently has been putting Aldara on every other day.  Does not use pumice stone.  Denies erythema or drainage.  States he has warts on his left hand.  Has these frozen in the past and did not resolve.    ROS:  see above         Allergies   Allergen Reactions    Penicillin G Anaphylaxis    Coumadin [Warfarin]        Current Outpatient Medications   Medication Sig Dispense Refill    imiquimod (ALDARA) 5 % external cream Apply a small sized amount to warts or molluscum three times weekly at bedtime.   Wash off after 8 hours.   May use for up to 16 weeks. 12 packet 3    tadalafil (CIALIS) 10 MG tablet Take 1 tablet (10 mg) by mouth daily as needed (ED). 30 tablet 1       Patient Active Problem List   Diagnosis    CARDIOVASCULAR SCREENING; LDL GOAL LESS THAN 160    History of appendectomy    Tobacco abuse    Pilonidal cyst    Seborrheic keratoses, inflamed    Personal history of DVT (deep vein thrombosis)       Past Medical History:   Diagnosis Date    Acute DVT (deep venous thrombosis) (H)     Compartment syndrome     History of blood transfusion        Past Surgical History:   Procedure Laterality Date    APPENDECTOMY      APPENDECTOMY      APPENDECTOMY      IR IVC FILTER PLACEMENT  11/23/2014    ORTHOPEDIC SURGERY  2013    finger reattachment    OTHER SURGICAL HISTORY      ORIF right 5th distal phalanx    VASCULAR SURGERY         Family History   Problem Relation Age of Onset    Asthma Mother     Thyroid Disease Father     C.A.D. Father     Heart Disease Father     Asthma Brother        Social History  "    Tobacco Use    Smoking status: Former     Current packs/day: 1.00     Types: Hookah, Cigarettes    Smokeless tobacco: Never   Substance Use Topics    Alcohol use: Yes     Comment: weekly         Exam:    Vitals: Ht 1.791 m (5' 10.5\")   Wt 87.1 kg (192 lb)   BMI 27.16 kg/m    BMI: Body mass index is 27.16 kg/m .  Height: 5' 10.5\"    Constitutional/ general:  Pt is in no apparent distress, appears well-nourished.  Cooperative with history and physical exam.     Psych:  The patient answered questions appropriately.  Normal affect.  Seems to have reasonable expectations, in terms of treatment.     Lungs:  Non labored breathing, non labored speech. No cough.  No audible wheezing. Even, quiet breathing.       Vascular:  positive pedal pulses bilaterally for both the DP and PT arteries.  CFT < 3 sec.  negative ankle edema.  positive pedal hair growth.    Neuro:  Alert and oriented x 3. Coordinated gait.  Light touch sensation is intact     Musculoskeletal:    Lower extremity muscle strength is normal.  Patient is ambulatory without an assistive device or brace.  No gross deformities.  Normal arch.        Derm: Normal texture and turgor.  No erythema, ecchymosis, or cyanosis.    Lesion located right heel plantar central and has cauliflower appearance with obliterated skin lines and pain with lateral compression.  No subcutaneus masses noted.  Petechia capillary impingement noted within the lesions.  No sign of infections or open wounds.  No drainage noted.  No signs of foreign body.    A/P    Verrucous lesion right heel    Discussed with patient this is a plantars wart.  Discussed cause.  I see no signs of foreign body.  Discussed treatment options and etiology of verrucous lesions at length.  Discussed I typically have Aldara put on daily.  Will use pumice stone and occlusion.  RETURN TO CLINIC PRN.    Swapnil Baker DPM, FACFAS      "

## 2025-04-09 NOTE — PATIENT INSTRUCTIONS
We wish you continued good healing. If you have any questions or concerns, please do not hesitate to contact us at  [phone]    [facility] (secure e-mail communication and access to your chart) to send a message or to make an appointment.    Please remember to call and schedule a follow up appointment if one was recommended at your earliest convenience.     PODIATRY CLINIC HOURS  TELEPHONE NUMBER    Dr. Swapnil GODOYPDAGO FACFAS        Clinics:  [facility]   [place]  Tuesday 1PM-6PM  [place]  Wednesday 745AM-330PM  [place]  Monday 2nd,4th  830AM-4PM  Thursday/Friday 745AM-230PM     [place] APPOINTMENTS  [phone]    [place] APPOINTMENTS  [phone]          If you need a medication refill, please contact us you may need lab work and/or a follow up visit prior to your refill (i.e. Antifungal medications).  If MRI needed please call Imaging at [phone]   HOW DO I GET MY KNEE SCOOTER? Knee scooters can be picked up at ANY Medical Supply stores with your knee scooter Prescription.  OR  Bring your signed prescription to an [facility] showroom.   Set up an appointment for your custom Orthotics. Call any Orthotics locations call [phone]

## 2025-04-09 NOTE — LETTER
4/9/2025      Brock Wood  [address] [place] [address]      Dear Colleague,    Thank you for referring your patient, Brock Wood, to the [facility]. Please see a copy of my visit note below.    Subjective:    Pt is seen today as a new pt referral with the c/c of painful lesion on the plantar aspect right heel.  This has been problematic for 7 month(s).  Pain is aggrevated by activity and relieved by rest.  Describes as burning pain and this is slowly getting worse.  Has tried OTC products without alleviation of lesion/symptoms.  He is wondering if this is from a foreign body.  He works as an .  Recently has been putting Aldara on every other day.  Does not use pumice stone.  Denies erythema or drainage.  States he has warts on his left hand.  Has these frozen in the past and did not resolve.    ROS:  see above         Allergies   Allergen Reactions     Penicillin G Anaphylaxis     Coumadin [Warfarin]        Current Outpatient Medications   Medication Sig Dispense Refill     imiquimod (ALDARA) 5 % external cream Apply a small sized amount to warts or molluscum three times weekly at bedtime.   Wash off after 8 hours.   May use for up to 16 weeks. 12 packet 3     tadalafil (CIALIS) 10 MG tablet Take 1 tablet (10 mg) by mouth daily as needed (ED). 30 tablet 1       Patient Active Problem List   Diagnosis     CARDIOVASCULAR SCREENING; LDL GOAL LESS THAN 160     History of appendectomy     Tobacco abuse     Pilonidal cyst     Seborrheic keratoses, inflamed     Personal history of DVT (deep vein thrombosis)       Past Medical History:   Diagnosis Date     Acute DVT (deep venous thrombosis) (H)      Compartment syndrome      History of blood transfusion        Past Surgical History:   Procedure Laterality Date     APPENDECTOMY       APPENDECTOMY       APPENDECTOMY       IR IVC FILTER PLACEMENT  11/23/2014     ORTHOPEDIC SURGERY  2013    finger reattachment     OTHER  "SURGICAL HISTORY      ORIF right 5th distal phalanx     VASCULAR SURGERY         Family History   Problem Relation Age of Onset     Asthma Mother      Thyroid Disease Father      C.A.D. Father      Heart Disease Father      Asthma Brother        Social History     Tobacco Use     Smoking status: Former     Current packs/day: 1.00     Types: Hookah, Cigarettes     Smokeless tobacco: Never   Substance Use Topics     Alcohol use: Yes     Comment: weekly         Exam:    Vitals: Ht 1.791 m (5' 10.5\")   Wt 87.1 kg (192 lb)   BMI 27.16 kg/m    BMI: Body mass index is 27.16 kg/m .  Height: 5' 10.5\"    Constitutional/ general:  Pt is in no apparent distress, appears well-nourished.  Cooperative with history and physical exam.     Psych:  The patient answered questions appropriately.  Normal affect.  Seems to have reasonable expectations, in terms of treatment.     Lungs:  Non labored breathing, non labored speech. No cough.  No audible wheezing. Even, quiet breathing.       Vascular:  positive pedal pulses bilaterally for both the DP and PT arteries.  CFT < 3 sec.  negative ankle edema.  positive pedal hair growth.    Neuro:  Alert and oriented x 3. Coordinated gait.  Light touch sensation is intact     Musculoskeletal:    Lower extremity muscle strength is normal.  Patient is ambulatory without an assistive device or brace.  No gross deformities.  Normal arch.        Derm: Normal texture and turgor.  No erythema, ecchymosis, or cyanosis.    Lesion located right heel plantar central and has cauliflower appearance with obliterated skin lines and pain with lateral compression.  No subcutaneus masses noted.  Petechia capillary impingement noted within the lesions.  No sign of infections or open wounds.  No drainage noted.  No signs of foreign body.    A/P    Verrucous lesion right heel    Discussed with patient this is a plantars wart.  Discussed cause.  I see no signs of foreign body.  Discussed treatment options and " etiology of verrucous lesions at length.  Discussed I typically have Aldara put on daily.  Will use pumice stone and occlusion.  RETURN TO CLINIC PRN.    Swapnil Baker DPM, FACFAS        Again, thank you for allowing me to participate in the care of your patient.        Sincerely,        Swapnil Baker DPM    Electronically signed

## 2025-04-10 ENCOUNTER — MYC MEDICAL ADVICE (OUTPATIENT)
Dept: FAMILY MEDICINE | Facility: CLINIC | Age: 40
End: 2025-04-10
Payer: COMMERCIAL

## 2025-04-10 RX ORDER — IMIQUIMOD 12.5 MG/.25G
CREAM TOPICAL
Qty: 12 PACKET | Refills: 3 | OUTPATIENT
Start: 2025-04-10

## 2025-05-01 ENCOUNTER — TELEPHONE (OUTPATIENT)
Dept: DERMATOLOGY | Facility: CLINIC | Age: 40
End: 2025-05-01
Payer: COMMERCIAL

## 2025-05-01 NOTE — TELEPHONE ENCOUNTER
Left Voicemail (1st Attempt) for the patient to call back and schedule the following:    Appointment type: Return  Provider: Sumaya Pinedo  Return date: 05/01/2026  Specialty phone number: [phone]    [facility]   Dermatology, Urology, General Surgery Specialties   [facility]